# Patient Record
Sex: MALE | Race: WHITE | NOT HISPANIC OR LATINO | URBAN - METROPOLITAN AREA
[De-identification: names, ages, dates, MRNs, and addresses within clinical notes are randomized per-mention and may not be internally consistent; named-entity substitution may affect disease eponyms.]

---

## 2017-08-01 ENCOUNTER — EMERGENCY (EMERGENCY)
Facility: HOSPITAL | Age: 62
LOS: 1 days | Discharge: HOME | End: 2017-08-01
Attending: EMERGENCY MEDICINE | Admitting: EMERGENCY MEDICINE
Payer: COMMERCIAL

## 2017-08-01 VITALS
DIASTOLIC BLOOD PRESSURE: 76 MMHG | TEMPERATURE: 98 F | SYSTOLIC BLOOD PRESSURE: 147 MMHG | HEART RATE: 58 BPM | RESPIRATION RATE: 16 BRPM | OXYGEN SATURATION: 96 %

## 2017-08-01 DIAGNOSIS — Z79.2 LONG TERM (CURRENT) USE OF ANTIBIOTICS: ICD-10-CM

## 2017-08-01 DIAGNOSIS — H93.19 TINNITUS, UNSPECIFIED EAR: ICD-10-CM

## 2017-08-01 DIAGNOSIS — R09.81 NASAL CONGESTION: ICD-10-CM

## 2017-08-01 DIAGNOSIS — R51 HEADACHE: ICD-10-CM

## 2017-08-01 DIAGNOSIS — J34.89 OTHER SPECIFIED DISORDERS OF NOSE AND NASAL SINUSES: ICD-10-CM

## 2017-08-01 PROCEDURE — 99283 EMERGENCY DEPT VISIT LOW MDM: CPT

## 2017-08-01 RX ADMIN — Medication 1 TABLET(S): at 16:44

## 2017-08-01 NOTE — ED PROVIDER NOTE - MEDICAL DECISION MAKING DETAILS
plan: po abxs, pt already has flonase at [facility], no red flags for concerning etiology for HA.

## 2017-08-01 NOTE — ED PROVIDER NOTE - CARDIAC, MLM
Normal rate, regular rhythm.  Heart sounds S1, S2.  No murmurs, rubs or gallops. normal rate, no murmurs

## 2017-08-01 NOTE — ED PROVIDER NOTE - ENMT, MLM
Airway patent, Nasal mucosa clear. Mouth with normal mucosa. Throat has no vesicles, no oropharyngeal exudates and uvula is midline. Mild pressure maxillary sinuses.

## 2017-08-01 NOTE — ED PROVIDER NOTE - OBJECTIVE STATEMENT
63 y/o M c/o HA, congestion, and sinus pressure x 3 days. Reports h/o recurrent sinusitis in the past usually every 6 months. Has seen ENT for recurrent sinusitis. Pt also c/o b/l tinnitus which he usually has with sinusitis. Denies f/c, chest pain, cough, ear pain, neck pain or rash.

## 2017-08-02 PROBLEM — Z00.00 ENCOUNTER FOR PREVENTIVE HEALTH EXAMINATION: Status: ACTIVE | Noted: 2017-08-02

## 2017-11-03 ENCOUNTER — APPOINTMENT (OUTPATIENT)
Dept: OTOLARYNGOLOGY | Facility: CLINIC | Age: 62
End: 2017-11-03
Payer: COMMERCIAL

## 2017-11-03 VITALS
HEART RATE: 65 BPM | WEIGHT: 210 LBS | DIASTOLIC BLOOD PRESSURE: 67 MMHG | HEIGHT: 67 IN | SYSTOLIC BLOOD PRESSURE: 118 MMHG | BODY MASS INDEX: 32.96 KG/M2

## 2017-11-03 DIAGNOSIS — J34.3 HYPERTROPHY OF NASAL TURBINATES: ICD-10-CM

## 2017-11-03 DIAGNOSIS — J34.2 DEVIATED NASAL SEPTUM: ICD-10-CM

## 2017-11-03 PROCEDURE — 31231 NASAL ENDOSCOPY DX: CPT

## 2017-11-03 PROCEDURE — 99215 OFFICE O/P EST HI 40 MIN: CPT | Mod: 25

## 2017-12-13 ENCOUNTER — APPOINTMENT (OUTPATIENT)
Dept: OTOLARYNGOLOGY | Facility: CLINIC | Age: 62
End: 2017-12-13

## 2018-05-10 ENCOUNTER — APPOINTMENT (OUTPATIENT)
Dept: OTOLARYNGOLOGY | Facility: CLINIC | Age: 63
End: 2018-05-10
Payer: COMMERCIAL

## 2018-05-10 VITALS
SYSTOLIC BLOOD PRESSURE: 134 MMHG | WEIGHT: 208 LBS | DIASTOLIC BLOOD PRESSURE: 84 MMHG | BODY MASS INDEX: 39.27 KG/M2 | HEIGHT: 61 IN | HEART RATE: 64 BPM

## 2018-05-10 DIAGNOSIS — J32.8 OTHER CHRONIC SINUSITIS: ICD-10-CM

## 2018-05-10 DIAGNOSIS — J01.81 OTHER ACUTE RECURRENT SINUSITIS: ICD-10-CM

## 2018-05-10 PROCEDURE — 99214 OFFICE O/P EST MOD 30 MIN: CPT

## 2018-05-10 RX ORDER — FLUTICASONE PROPIONATE 50 UG/1
50 SPRAY, METERED NASAL DAILY
Qty: 1 | Refills: 3 | Status: COMPLETED | COMMUNITY
Start: 2017-11-03 | End: 2018-05-10

## 2018-05-10 RX ORDER — FLUTICASONE PROPIONATE 50 UG/1
50 SPRAY, METERED NASAL
Refills: 0 | Status: COMPLETED | COMMUNITY
End: 2018-05-10

## 2018-05-11 PROBLEM — J32.8 OTHER CHRONIC SINUSITIS: Status: ACTIVE | Noted: 2017-11-03

## 2018-05-13 PROBLEM — J01.81 OTHER ACUTE RECURRENT SINUSITIS: Status: RESOLVED | Noted: 2017-11-27 | Resolved: 2018-05-13

## 2018-05-30 ENCOUNTER — APPOINTMENT (OUTPATIENT)
Dept: OTOLARYNGOLOGY | Facility: CLINIC | Age: 63
End: 2018-05-30

## 2018-06-20 ENCOUNTER — APPOINTMENT (OUTPATIENT)
Dept: OTOLARYNGOLOGY | Facility: CLINIC | Age: 63
End: 2018-06-20
Payer: COMMERCIAL

## 2018-06-20 VITALS
HEIGHT: 66 IN | BODY MASS INDEX: 34.55 KG/M2 | SYSTOLIC BLOOD PRESSURE: 124 MMHG | WEIGHT: 215 LBS | DIASTOLIC BLOOD PRESSURE: 79 MMHG | HEART RATE: 64 BPM

## 2018-06-20 PROCEDURE — 31575 DIAGNOSTIC LARYNGOSCOPY: CPT

## 2018-06-20 PROCEDURE — 99214 OFFICE O/P EST MOD 30 MIN: CPT | Mod: 25

## 2018-06-28 RX ORDER — VALACYCLOVIR 1 G/1
1 TABLET, FILM COATED ORAL DAILY
Refills: 0 | Status: COMPLETED | COMMUNITY
End: 2018-05-30

## 2018-06-29 ENCOUNTER — APPOINTMENT (OUTPATIENT)
Dept: CT IMAGING | Facility: CLINIC | Age: 63
End: 2018-06-29

## 2018-06-29 ENCOUNTER — OUTPATIENT (OUTPATIENT)
Dept: OUTPATIENT SERVICES | Facility: HOSPITAL | Age: 63
LOS: 1 days | End: 2018-06-29

## 2018-06-29 ENCOUNTER — EMERGENCY (EMERGENCY)
Facility: HOSPITAL | Age: 63
LOS: 1 days | Discharge: HOME | End: 2018-06-29
Admitting: EMERGENCY MEDICINE
Payer: COMMERCIAL

## 2018-06-29 VITALS
HEART RATE: 52 BPM | OXYGEN SATURATION: 100 % | DIASTOLIC BLOOD PRESSURE: 63 MMHG | TEMPERATURE: 97 F | RESPIRATION RATE: 18 BRPM | SYSTOLIC BLOOD PRESSURE: 129 MMHG

## 2018-06-29 VITALS — HEART RATE: 47 BPM

## 2018-06-29 DIAGNOSIS — Z79.2 LONG TERM (CURRENT) USE OF ANTIBIOTICS: ICD-10-CM

## 2018-06-29 DIAGNOSIS — R07.0 PAIN IN THROAT: ICD-10-CM

## 2018-06-29 DIAGNOSIS — R55 SYNCOPE AND COLLAPSE: ICD-10-CM

## 2018-06-29 LAB
ALBUMIN SERPL ELPH-MCNC: 3.6 G/DL — SIGNIFICANT CHANGE UP (ref 3.4–5)
ALP SERPL-CCNC: 49 U/L — SIGNIFICANT CHANGE UP (ref 40–120)
ALT FLD-CCNC: 30 U/L — SIGNIFICANT CHANGE UP (ref 12–42)
ANION GAP SERPL CALC-SCNC: 8 MMOL/L — LOW (ref 9–16)
AST SERPL-CCNC: 24 U/L — SIGNIFICANT CHANGE UP (ref 15–37)
BILIRUB SERPL-MCNC: 1.1 MG/DL — SIGNIFICANT CHANGE UP (ref 0.2–1.2)
BUN SERPL-MCNC: 20 MG/DL — SIGNIFICANT CHANGE UP (ref 7–23)
CALCIUM SERPL-MCNC: 9.1 MG/DL — SIGNIFICANT CHANGE UP (ref 8.5–10.5)
CHLORIDE SERPL-SCNC: 107 MMOL/L — SIGNIFICANT CHANGE UP (ref 96–108)
CO2 SERPL-SCNC: 25 MMOL/L — SIGNIFICANT CHANGE UP (ref 22–31)
CREAT SERPL-MCNC: 1.2 MG/DL — SIGNIFICANT CHANGE UP (ref 0.5–1.3)
D DIMER BLD IA.RAPID-MCNC: <150 NG/ML DDU — SIGNIFICANT CHANGE UP
GLUCOSE SERPL-MCNC: 118 MG/DL — HIGH (ref 70–99)
HCT VFR BLD CALC: 42.7 % — SIGNIFICANT CHANGE UP (ref 39–50)
HGB BLD-MCNC: 15.6 G/DL — SIGNIFICANT CHANGE UP (ref 13–17)
MCHC RBC-ENTMCNC: 31.1 PG — SIGNIFICANT CHANGE UP (ref 27–34)
MCHC RBC-ENTMCNC: 36.5 G/DL — HIGH (ref 32–36)
MCV RBC AUTO: 85.2 FL — SIGNIFICANT CHANGE UP (ref 80–100)
PLATELET # BLD AUTO: 203 K/UL — SIGNIFICANT CHANGE UP (ref 150–400)
POTASSIUM SERPL-MCNC: 3.8 MMOL/L — SIGNIFICANT CHANGE UP (ref 3.5–5.3)
POTASSIUM SERPL-SCNC: 3.8 MMOL/L — SIGNIFICANT CHANGE UP (ref 3.5–5.3)
PROT SERPL-MCNC: 7 G/DL — SIGNIFICANT CHANGE UP (ref 6.4–8.2)
RBC # BLD: 5.01 M/UL — SIGNIFICANT CHANGE UP (ref 4.2–5.8)
RBC # FLD: 11.8 % — SIGNIFICANT CHANGE UP (ref 10.3–16.9)
SODIUM SERPL-SCNC: 140 MMOL/L — SIGNIFICANT CHANGE UP (ref 132–145)
TROPONIN I SERPL-MCNC: <0.017 NG/ML — LOW (ref 0.02–0.06)
WBC # BLD: 8.6 K/UL — SIGNIFICANT CHANGE UP (ref 3.8–10.5)
WBC # FLD AUTO: 8.6 K/UL — SIGNIFICANT CHANGE UP (ref 3.8–10.5)

## 2018-06-29 PROCEDURE — 99284 EMERGENCY DEPT VISIT MOD MDM: CPT | Mod: 25

## 2018-06-29 PROCEDURE — 70491 CT SOFT TISSUE NECK W/DYE: CPT | Mod: 26

## 2018-06-29 PROCEDURE — 93010 ELECTROCARDIOGRAM REPORT: CPT

## 2018-06-29 RX ORDER — ATROPINE SULFATE 0.1 MG/ML
0.5 SYRINGE (ML) INJECTION ONCE
Qty: 0 | Refills: 0 | Status: COMPLETED | OUTPATIENT
Start: 2018-06-29 | End: 2018-06-29

## 2018-06-29 RX ORDER — SODIUM CHLORIDE 9 MG/ML
1000 INJECTION INTRAMUSCULAR; INTRAVENOUS; SUBCUTANEOUS ONCE
Qty: 0 | Refills: 0 | Status: COMPLETED | OUTPATIENT
Start: 2018-06-29 | End: 2018-06-29

## 2018-06-29 RX ORDER — ONDANSETRON 8 MG/1
4 TABLET, FILM COATED ORAL ONCE
Qty: 0 | Refills: 0 | Status: COMPLETED | OUTPATIENT
Start: 2018-06-29 | End: 2018-06-29

## 2018-06-29 RX ADMIN — SODIUM CHLORIDE 2000 MILLILITER(S): 9 INJECTION INTRAMUSCULAR; INTRAVENOUS; SUBCUTANEOUS at 13:00

## 2018-06-29 RX ADMIN — ONDANSETRON 4 MILLIGRAM(S): 8 TABLET, FILM COATED ORAL at 14:15

## 2018-06-29 RX ADMIN — SODIUM CHLORIDE 1000 MILLILITER(S): 9 INJECTION INTRAMUSCULAR; INTRAVENOUS; SUBCUTANEOUS at 14:58

## 2018-06-29 NOTE — ED ADULT NURSE REASSESSMENT NOTE - NS ED NURSE REASSESS COMMENT FT1
Pt alert, attached to the cardiac monitor. Denies pain or dizziness. Pt awaiting test results and disposition

## 2018-06-29 NOTE — ED PROVIDER NOTE - PHYSICAL EXAMINATION
Vital Signs - nursing notes reviewed and confirmed  Gen - WDWN M, NAD, comfortable and non-toxic appearing, speaking in full sentences   Skin - warm, clammy, no acute rash   HEENT - AT/NC, PERRL, EOMI, no conjunctival injection, moist oral mucosa, o/p clear with no erythema, edema, or exudate, uvula midline, airway patent, neck supple and NT, FROM, no JVD or carotid bruits b/l, no palpable nodes  CV - S1S2, R/R/R  Resp - respiration non-labored, CTAB, symmetric bs b/l, no r/r/w  GI - NABS, soft, ND, NT, no rebound or guarding, no CVAT b/l   MS - w/w/p, no c/c/e, calves supple and NT, distal pulses symmetric b/l, brisk cap refills   Neuro - AxOx3, no focal neuro deficits, CN II-XII grossly intact, cerebellar function intact, negative pronator drift, negative nystagmus, ambulatory without gait disturbance

## 2018-06-29 NOTE — ED PROVIDER NOTE - MEDICAL DECISION MAKING DETAILS
AFVSS at time of d/c, pt non-toxic appearing, results, ddx, and f/u plans discussed with pt at bedside, d/c'd home to f/u with PMD, strict return precautions discussed, prompt return to ER for any worsening or new sx, pt verbalized understanding. pt scheduled for outpt CT this morning, has been fasting since last night, also ran 3 miles this morning, with h/o vasovagal syncope, s/p IV insertion with witnessed syncope, no focal neuro deficits, EKG wnl, labs unremarkable, francisco to 40s on cardiac monitor but pt asymptomatic and at baseline fit and athletic, trop neg x 2, CT neck results discussed, sx markedly improved s/p IVF, AFVSS at time of d/c, pt non-toxic appearing, results, ddx, and f/u plans discussed with pt at bedside, d/c'd home to f/u with PMD and ENT, strict return precautions discussed, prompt return to ER for any worsening or new sx, pt verbalized understanding.

## 2018-06-29 NOTE — ED ADULT NURSE REASSESSMENT NOTE - NS ED NURSE REASSESS COMMENT FT1
Pt layign in stretcher in no acute distres. Maintained on cardiac monitor, sinus bradycardia (42-46). Pt denies any MEYER or dizziness. Pt reports feeling better compared to when he arrived at ER. LADARIUS Barajas made aware. As per LADARIUS Barajas, hold Atropine, administer only if pt symptomatic. Call bell within reach. Safety precautions maintained.

## 2018-06-29 NOTE — ED PROVIDER NOTE - OBJECTIVE STATEMENT
64 yo M with PMHx of vasovagal syncope due to sight of blood in the past, sent from outpt imaging center from [facility] for witnessed syncope.  Pt reports fasting since last night and was getting an IV inserted upstairs in the outpt imaging center 64 yo M with PMHx of vasovagal syncope due to sight of blood in the past, sent from outpt imaging center from [facility] for witnessed syncope.  Pt reports fasting since last night for schedule outpt CT neck with IV contrast today and was getting an IV inserted upstairs in the outpt imaging center, noted pt turned pale, sweaty and syncopized.  Collapsed onto the floor, picked up by the staff and placed on stretcher, regained consciousness without any interventions in a few seconds.  Pt reports feeling nauseated subsequently.  Denies other prodromes, head trauma, vertigo, numbness, tingling, photophobia, diplopia, change in vision/hearing/gait/mental status/speech, focal weakness, rash, fever, chills, stiffness, CP, SOB, palpitations, diaphoresis, N/V/D/C, abdominal pain, change in urinary/bowel function/incontinence, dysuria, hematuria, flank pain, and malaise.

## 2018-06-29 NOTE — ED PROVIDER NOTE - CARE PLAN
Principal Discharge DX:	Vasovagal syncope Principal Discharge DX:	Vasovagal syncope  Secondary Diagnosis:	Throat pain

## 2018-06-29 NOTE — ED ADULT NURSE NOTE - OBJECTIVE STATEMENT
Pt. was at imaging suite when he syncopized during IV insertion. Pt. reports being NPO since last night and he ran [employer] this morning before his exam. Pt. feeling better at this time, remains on continuous cardiac monitoring and IV fluids running.

## 2018-06-29 NOTE — ED ADULT TRIAGE NOTE - CHIEF COMPLAINT QUOTE
pt. was upstairs in radiology when they were inserting IV. pt. has a syncopal episode. pt. has a hx of vasovagal maneuvers. pt. aaox3, able to answer questions.

## 2018-07-20 ENCOUNTER — APPOINTMENT (OUTPATIENT)
Dept: OTOLARYNGOLOGY | Facility: CLINIC | Age: 63
End: 2018-07-20
Payer: COMMERCIAL

## 2018-07-20 VITALS
BODY MASS INDEX: 34.39 KG/M2 | WEIGHT: 214 LBS | HEART RATE: 67 BPM | DIASTOLIC BLOOD PRESSURE: 67 MMHG | HEIGHT: 66 IN | SYSTOLIC BLOOD PRESSURE: 107 MMHG

## 2018-07-20 DIAGNOSIS — B00.2 HERPESVIRAL GINGIVOSTOMATITIS AND PHARYNGOTONSILLITIS: ICD-10-CM

## 2018-07-20 PROCEDURE — 31575 DIAGNOSTIC LARYNGOSCOPY: CPT

## 2018-07-20 PROCEDURE — 99215 OFFICE O/P EST HI 40 MIN: CPT | Mod: 25

## 2018-07-20 RX ORDER — CLINDAMYCIN HYDROCHLORIDE 300 MG/1
300 CAPSULE ORAL
Qty: 30 | Refills: 0 | Status: DISCONTINUED | COMMUNITY
Start: 2018-06-20 | End: 2018-07-20

## 2018-07-24 PROBLEM — B00.2 ACUTE PHARYNGITIS DUE TO HERPES SIMPLEX VIRUS (HSV): Status: RESOLVED | Noted: 2018-05-11 | Resolved: 2018-07-24

## 2018-07-31 PROBLEM — J32.9 CHRONIC SINUSITIS, UNSPECIFIED: Chronic | Status: ACTIVE | Noted: 2017-08-01

## 2018-07-31 PROBLEM — R55 SYNCOPE AND COLLAPSE: Chronic | Status: ACTIVE | Noted: 2018-06-29

## 2018-08-27 NOTE — PHYSICAL EXAM
[] : septum deviated to the left [Laryngoscopy Performed] : laryngoscopy was performed, see procedure section for findings [de-identified] : 1-2+ bilateral.  Left tonsil with mild erythyema; cloudy mucus expressed from crypts when tonsil is pressed on.  Right tonsil is normal. [Normal] : no neck adenopathy

## 2018-08-27 NOTE — CONSULT LETTER
[Dear  ___] : Dear  [unfilled], [Courtesy Letter:] : I had the pleasure of seeing your patient, [unfilled], in my office today. [Consult Closing:] : Thank you very much for allowing me to participate in the care of this patient.  If you have any questions, please do not hesitate to contact me. [Sincerely,] : Sincerely, [___] : [unfilled] [FreeTextEntry2] : Dr. Blake Carranza\par [address] [place] [address]\par [place] [id number] [FreeTextEntry1] : \par \par \par Enclosed please find my office notes for July 20, 2018.\par \par

## 2018-08-27 NOTE — HISTORY OF PRESENT ILLNESS
[de-identified] : Mr. WADSWORTH was seen in f/up for chronic left throat pain.\par \par Two weeks ago he saw another provider and mentioned the left sharp pain and throbbing, cyclobenzaprine and Naproxen was prescribed. Since starting on the new medication, he has noticed an improvement in muscle tension. \par The sharpness in his throat is not as severe but is still persistent. \par He wakes up with a mild sore throat that can last all day. No problems with swallowing. \par Carbonated soda and dairy cause throat to become more severe. \par Sometimes when he massages his tonsil he feels better. \par He states pain was about 7-8/10 initially, now 4/10. \par \par \par CT NECK with contrast (6-) at [facility]:\par *  Aerodigestive Tract: Imaging through the oral cavity and oropharynx is somewhat limited by streak artifact. There is questionable asymmetric fullness/lucent appearance on the left lateral oropharynx (series 2, image 53; series 601, image 47). This is not appreciated, however, on the re-angled series 4. There is no suspicious site of nodular or masslike contrast enhancement. The airway is patent. The larynx appears within normal limits. No retropharyngeal edema.\par *  Lymph Nodes: There are no pathologically enlarged or morphologically suspicious lymph nodes in the neck\par *  Salivary Glands: No focal lesion or acute inflammatory change\par *  Thyroid Gland: Unremarkable\par *  Orbits: Unremarkable\par *  Brain: The partially imaged intracranial compartment shows no acute finding. No hydrocephalus or brain herniation\par *  Skull Base: Intact\par *  Paranasal Sinuses: Well ventilated aside from minimal left maxillary sinus for polyp or cyst.\par *  Mastoids: Well ventilated\par *  Cervical Spine: No acute finding. There is moderate degenerative disc disease at C5-6, C6-7 and C7-T1.\par *  Lung Apices: The included portions of the lung apices show no suspicious mass or nodule.\par IMPRESSION:\par - Source of left throat/neck pain is not clearly elucidated. There is no suspicious nodule or masslike enhancement along the aerodigestive tract.  \par - Questionable asymmetric fullness/lucency along the left lateral oropharynx. Consider correlation with direct inspection and ENT referral as clinically warranted.\par (Images were reviewed.)\par \par Prior Hx:\par Starting in 2nd week of May, treated with Valtrex for 20 days for possible herpes pharyngitis. His throat pain improved greatly. 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 \par About a week after stopping the Valtrex, had severe pain in back of throat on left, along with tightness left lateral neck like choking. His throat pain is sharp and worse than before. Pain worse when wakes in AM. \par Massage of trapezius muscle on left side relieves the soreness in left throat muscle and pain in left lower neck.\par \par \par LABS\par (5/02/2018) - HSV 1 IGG 54 (H), HSV 2 IGG 4.03 (H), C. Trachomatis negative, N. Gonorrhea RNA negative, RPR negative, Throat culture negative, WBC 7.6, CRP 2.4\par

## 2018-08-27 NOTE — ASSESSMENT
[FreeTextEntry1] : Mr. Street has the following issues identified:\par \par 1.) Left throat pain -- due to chronic tonsillitis on left side\par 2.) Left neck pain - likely musculoskeletal; improvement with cyclobenzaprine and Naproxen\par \par Plan:\par -- Continue using Cyclobenzaprine and Naproxen for neck pain as needed\par -- May need physical therapy for neck pain \par -- Peridex gargles\par -- I recommend left tonsillectomy to alleviate the chronic throat pain.  The right tonsil is small and not symptomatic so will not remove.  I have explained the risks of tonsillectomy including but not limited to general anesthesia, bleeding, infection, injury to the teeth/lips/gums, tonsil regrowth, persistence of symptoms, velopharyngeal insufficiency, swallowing dysfunction, post-operative hemorrhage, voice changes, and possible need for further procedures.  His questions were answered and he agreed to proceed.\par Surgery is scheduled for August 28, 2018.\par Medical evaluation and clearance from Dr. Carranza would be greatly appreciated.

## 2018-08-28 ENCOUNTER — OUTPATIENT (OUTPATIENT)
Dept: OUTPATIENT SERVICES | Facility: HOSPITAL | Age: 63
LOS: 1 days | Discharge: HOME | End: 2018-08-28

## 2018-08-28 ENCOUNTER — RESULT REVIEW (OUTPATIENT)
Age: 63
End: 2018-08-28

## 2018-08-28 ENCOUNTER — APPOINTMENT (OUTPATIENT)
Dept: OTOLARYNGOLOGY | Facility: HOSPITAL | Age: 63
End: 2018-08-28
Payer: COMMERCIAL

## 2018-08-28 PROCEDURE — 42826 REMOVAL OF TONSILS: CPT

## 2018-08-31 LAB — SURGICAL PATHOLOGY STUDY: SIGNIFICANT CHANGE UP

## 2018-09-12 ENCOUNTER — APPOINTMENT (OUTPATIENT)
Dept: OTOLARYNGOLOGY | Facility: CLINIC | Age: 63
End: 2018-09-12

## 2018-09-12 ENCOUNTER — APPOINTMENT (OUTPATIENT)
Dept: OTOLARYNGOLOGY | Facility: CLINIC | Age: 63
End: 2018-09-12
Payer: COMMERCIAL

## 2018-09-12 VITALS
BODY MASS INDEX: 33.91 KG/M2 | HEIGHT: 66 IN | SYSTOLIC BLOOD PRESSURE: 130 MMHG | HEART RATE: 66 BPM | DIASTOLIC BLOOD PRESSURE: 74 MMHG | WEIGHT: 211 LBS

## 2018-09-12 DIAGNOSIS — J35.8 OTHER CHRONIC DISEASES OF TONSILS AND ADENOIDS: ICD-10-CM

## 2018-09-12 DIAGNOSIS — G89.18 OTHER ACUTE POSTPROCEDURAL PAIN: ICD-10-CM

## 2018-09-12 PROCEDURE — 99024 POSTOP FOLLOW-UP VISIT: CPT

## 2018-09-12 RX ORDER — CHLORHEXIDINE GLUCONATE, 0.12% ORAL RINSE 1.2 MG/ML
0.12 SOLUTION DENTAL
Qty: 1 | Refills: 0 | Status: DISCONTINUED | COMMUNITY
Start: 2018-07-20 | End: 2018-09-12

## 2018-09-12 RX ORDER — NAPROXEN 500 MG/1
TABLET ORAL
Refills: 0 | Status: DISCONTINUED | COMMUNITY
End: 2018-09-12

## 2018-09-12 RX ORDER — AMOXICILLIN 400 MG/5ML
400 FOR SUSPENSION ORAL TWICE DAILY
Qty: 5 | Refills: 0 | Status: DISCONTINUED | COMMUNITY
Start: 2018-08-28 | End: 2018-09-12

## 2018-09-12 RX ORDER — CYCLOBENZAPRINE HYDROCHLORIDE 7.5 MG/1
TABLET, FILM COATED ORAL
Refills: 0 | Status: DISCONTINUED | COMMUNITY
End: 2018-09-12

## 2018-09-13 PROBLEM — G89.18 POST-OP PAIN: Status: RESOLVED | Noted: 2018-08-28 | Resolved: 2018-09-13

## 2018-09-13 PROBLEM — J35.8 TONSILLITH: Status: RESOLVED | Noted: 2018-07-20 | Resolved: 2018-09-13

## 2018-10-10 ENCOUNTER — APPOINTMENT (OUTPATIENT)
Dept: OTOLARYNGOLOGY | Facility: CLINIC | Age: 63
End: 2018-10-10

## 2019-01-10 ENCOUNTER — APPOINTMENT (OUTPATIENT)
Dept: OTOLARYNGOLOGY | Facility: CLINIC | Age: 64
End: 2019-01-10

## 2019-01-23 ENCOUNTER — APPOINTMENT (OUTPATIENT)
Dept: OTOLARYNGOLOGY | Facility: CLINIC | Age: 64
End: 2019-01-23
Payer: COMMERCIAL

## 2019-01-23 VITALS
WEIGHT: 204 LBS | HEIGHT: 66 IN | HEART RATE: 72 BPM | BODY MASS INDEX: 32.78 KG/M2 | SYSTOLIC BLOOD PRESSURE: 111 MMHG | DIASTOLIC BLOOD PRESSURE: 69 MMHG

## 2019-01-23 PROCEDURE — 99214 OFFICE O/P EST MOD 30 MIN: CPT | Mod: 25

## 2019-01-23 PROCEDURE — 31575 DIAGNOSTIC LARYNGOSCOPY: CPT

## 2019-01-30 NOTE — PHYSICAL EXAM
[] : septum deviated to the left [Midline] : trachea located in midline position [Removed] : palatine tonsils previously removed [Normal] : no neck adenopathy [Laryngoscopy Performed] : laryngoscopy was performed, see procedure section for findings [de-identified] : Mild erythema at junction of left tonsil fossa and posterior wall - accessory lymphoid tissue.

## 2019-01-30 NOTE — PROCEDURE
[de-identified] : \par Indication:  reflux\par -Verbal consent was obtained from patient prior to procedure.\par -Ja-Synephrine and lidocaine 2% spray applied to the nasal cavities.\par Flexible laryngoscopy was performed via nostril and revealed the following:\par   -- Nasopharynx had no mass or exudate.\par   -- Base of tongue was symmetric and not enlarged.\par   -- Vallecula was clear\par   -- Epiglottis, both aryepiglottic folds and both false vocal folds were normal\par   -- Arytenoids both with mild edema and erythema \par   -- True vocal folds were fully mobile and without lesions. \par   -- Post cricoid area was clear.\par   -- Interarytenoid edema was absent \par \par The patient tolerated the procedure well.\par

## 2019-01-30 NOTE — HISTORY OF PRESENT ILLNESS
[de-identified] : Mr. WADSWORTH  is a 63 year old M being seen today for recurrent throat pain. \par \par S/p left tonsillectomy for chronic tonsillitis on August 28, 2018 at [facility].\par When he massaged left tonsil scar, low level pain would go away.\par Then during from the middle of October to mid December 2018, he started experiencing mild left-sided throat discomfort, similar to his initial symptom, but not as severe. When pain is more severe, the left side of his neck gets tense, better when applies warm compress.  The muscular pain in left neck is mostly gone\par He feels the discomfort starts at top of his left tonsil scar\par On December 15 he noticed the pain become much stronger, causing a persistent sharp/throbbing pain x 3 weeks.\par Now severity of throat discomfort is about 1-2/10, but it is causing him increased anxiety. \par \par Also since tonsillectomy, he is complaining of indigestion, nausea and gas. \par Has thick saliva and phlegm in throat after eating. \par Does not think the cause is acid reflux because he only feels the sx on one side. \par Pain seems to decrease if a warm compress on the neck is placed. \par Pain also seems to temporarily resolve when he blows his nose and mucus comes out of left nostril. \par Lozenges do not help. \par \par \par PATHOLOGY Left tonsil (8/28/2018)\par - Tonsillar tissue with reactive with reactive lymphoid hyperplasia \par

## 2019-01-30 NOTE — ASSESSMENT
[FreeTextEntry1] : Mr. WADSWORTH has throat pain that is diminished but still present on left side.\par He is s/p-left tonsillectomy for chronic tonsillitis. \par There is a small scar band in the left tonsil fossa, as well a a small area of erythema that is c/w accessory lymphoid tissue along the border of fossa and posterior wall pharynx.\par He also may have element of acid reflux causing his other symptoms.\par \par Plan:\par - Salt water gargles as needed\par - Reflux precautions\par - return to office for cautery ablation of accessory tonsil tissue on lateral posterior pharyngeal wall left side\par

## 2019-01-30 NOTE — CONSULT LETTER
[Dear  ___] : Dear  [unfilled], [Courtesy Letter:] : I had the pleasure of seeing your patient, [unfilled], in my office today. [Consult Closing:] : Thank you very much for allowing me to participate in the care of this patient.  If you have any questions, please do not hesitate to contact me. [Sincerely,] : Sincerely, [FreeTextEntry2] : Dr. Blake Carranza\par [address] [place] [address]\par [place] [id number] [FreeTextEntry1] : \par \par Enclosed please find my office notes for January 23, 2019. \par \par \par \par  [FreeTextEntry3] : \par Natalie Trejo MD \par Otolaryngology, Head and Neck Surgery \par Residency site , [facility]\par

## 2019-02-06 ENCOUNTER — APPOINTMENT (OUTPATIENT)
Dept: OTOLARYNGOLOGY | Facility: CLINIC | Age: 64
End: 2019-02-06
Payer: COMMERCIAL

## 2019-02-06 VITALS
DIASTOLIC BLOOD PRESSURE: 57 MMHG | HEIGHT: 66 IN | BODY MASS INDEX: 32.78 KG/M2 | WEIGHT: 204 LBS | SYSTOLIC BLOOD PRESSURE: 109 MMHG | HEART RATE: 49 BPM

## 2019-02-06 PROCEDURE — 42808 EXCISE PHARYNX LESION: CPT

## 2019-03-01 ENCOUNTER — APPOINTMENT (OUTPATIENT)
Dept: OTOLARYNGOLOGY | Facility: CLINIC | Age: 64
End: 2019-03-01
Payer: COMMERCIAL

## 2019-03-01 VITALS
DIASTOLIC BLOOD PRESSURE: 65 MMHG | SYSTOLIC BLOOD PRESSURE: 121 MMHG | HEIGHT: 66 IN | HEART RATE: 58 BPM | BODY MASS INDEX: 32.14 KG/M2 | WEIGHT: 200 LBS

## 2019-03-01 PROCEDURE — 42808 EXCISE PHARYNX LESION: CPT

## 2019-03-01 PROCEDURE — 99213 OFFICE O/P EST LOW 20 MIN: CPT | Mod: 25

## 2019-03-02 NOTE — PROCEDURE
[FreeTextEntry1] : cautery ablation of left posterior pharyngeal tonsil tissue [FreeTextEntry2] : chronic tonsillitis and throat pain [FreeTextEntry3] : After informed consent was obtained, the left posterior pharyngeal wall tonsil tissue was sprayed with Cetacaine.  The residual tonsil tissue was cauterized with silver nitrate.  No bleeding occurred.\par He tolerated procedure well.\par

## 2019-03-02 NOTE — CONSULT LETTER
[Dear  ___] : Dear  [unfilled], [Courtesy Letter:] : I had the pleasure of seeing your patient, [unfilled], in my office today. [Consult Closing:] : Thank you very much for allowing me to participate in the care of this patient.  If you have any questions, please do not hesitate to contact me. [Sincerely,] : Sincerely, [FreeTextEntry2] : Dr. Blake Carranza\par [address] [place] [address]\par [place] [phone] [FreeTextEntry1] : \par \par Enclosed please find my office notes for March 1, 2019. \par \par \par \par  [FreeTextEntry3] : \par Natalie Trejo MD \par Otolaryngology, Head and Neck Surgery \par Residency site , [facility]\par

## 2019-03-02 NOTE — HISTORY OF PRESENT ILLNESS
[de-identified] : Mr. Street reports that left throat pain almost disappeared after cautery in last month.\par This week, pain is noticeable but much less than before.\par By this time every year, before the tonsillectomy, he would get URI and lingering sore throat but has not, so he is happy he had the surgery done.\par S/p left tonsillectomy for chronic tonsillitis on August 28, 2018.

## 2019-03-02 NOTE — ASSESSMENT
[FreeTextEntry1] : MR. Street had cautery of left posterior wall tonsil tissue.\par \par He will return in 1 month if he still has pain.

## 2019-03-02 NOTE — PROCEDURE
[FreeTextEntry1] : cautery ablation of left posterior pharyngeal tonsil tissues [FreeTextEntry2] : chronic tonsillitis [FreeTextEntry3] : After informed consent was obtained, the left posterior pharyngeal wall tonsil tissue was sprayed with Cetacaine.  The tonsil tissue was then cauterized with silver nitrate.  No bleeding occurred.\par

## 2019-03-02 NOTE — PHYSICAL EXAM
[] : septum deviated to the left [Midline] : trachea located in midline position [Removed] : palatine tonsils previously removed [Laryngoscopy Performed] : laryngoscopy was performed, see procedure section for findings [Normal] : no neck adenopathy [de-identified] : Mild erythema at junction of left tonsil fossa and posterior wall - accessory lymphoid tissue.

## 2019-03-02 NOTE — HISTORY OF PRESENT ILLNESS
[de-identified] : Mr. Street is still having mild pain in left side of throat.\par He is here for cautery ablation of posterior pharyngeal tonsil tissue.

## 2019-03-02 NOTE — ASSESSMENT
[FreeTextEntry1] : MR. Street had repeat cautery of left posterior wall tonsil tissue.\par Throat pain diminished markedly after initial cautery 1 month ago.\par \par He will return in 1 month if he still has pain.

## 2019-04-05 ENCOUNTER — APPOINTMENT (OUTPATIENT)
Dept: OTOLARYNGOLOGY | Facility: CLINIC | Age: 64
End: 2019-04-05
Payer: COMMERCIAL

## 2019-04-05 VITALS
DIASTOLIC BLOOD PRESSURE: 63 MMHG | HEIGHT: 66 IN | BODY MASS INDEX: 31.18 KG/M2 | WEIGHT: 194 LBS | SYSTOLIC BLOOD PRESSURE: 109 MMHG | HEART RATE: 54 BPM

## 2019-04-05 PROCEDURE — 99213 OFFICE O/P EST LOW 20 MIN: CPT

## 2019-04-05 NOTE — ASSESSMENT
[FreeTextEntry1] : Mr. Street has only mild throat discomfort on left side now.\par Today, he had repeat cautery of left posterior-lateral wall tonsil tissue.\par \par He will return in 1 month if he still has pain.

## 2019-04-05 NOTE — PHYSICAL EXAM
[] : septum deviated to the left [Removed] : palatine tonsils previously removed [Normal] : no neck adenopathy [de-identified] : Minimal erythema at junction of left tonsil fossa and posterolateral wall - accessory lymphoid tissue was cauterized with silver nitrate again today.

## 2019-04-05 NOTE — CONSULT LETTER
[Dear  ___] : Dear ~RIDDHI, [Courtesy Letter:] : I had the pleasure of seeing your patient, [unfilled], in my office today. [Consult Closing:] : Thank you very much for allowing me to participate in the care of this patient.  If you have any questions, please do not hesitate to contact me. [Sincerely,] : Sincerely, [FreeTextEntry2] : Latricia Baxter NP\par [address] [place] [address]\[place] [address] [FreeTextEntry1] : \par \par Enclosed please find my office notes for April 5, 2019. \par \par \par \par  [FreeTextEntry3] : \par Natalie Trejo MD \par Otolaryngology, Head and Neck Surgery \par Residency site , [facility]\par

## 2019-04-05 NOTE — HISTORY OF PRESENT ILLNESS
[de-identified] : Mr. Street reports that he no longer has left throat pain, only feels a discomfort at times throughout the day.\par The throat pain has steadily improved after serial cautery of the lateral pharyngeal wall lymphoid tissue adjacent to left tonsillectomy site. \par This winter, he did not have his usual sore throat and severe URI.\par S/p left tonsillectomy for chronic tonsillitis on August 28, 2018.\par \par Bilateral tinnitus is still present but he lives with it.\par No change in hearing.

## 2019-05-08 ENCOUNTER — APPOINTMENT (OUTPATIENT)
Dept: OTOLARYNGOLOGY | Facility: CLINIC | Age: 64
End: 2019-05-08

## 2021-03-20 ENCOUNTER — EMERGENCY (EMERGENCY)
Facility: HOSPITAL | Age: 66
LOS: 1 days | Discharge: HOME | End: 2021-03-20
Admitting: EMERGENCY MEDICINE
Payer: MEDICARE

## 2021-03-20 VITALS
HEIGHT: 66 IN | DIASTOLIC BLOOD PRESSURE: 80 MMHG | WEIGHT: 210.1 LBS | RESPIRATION RATE: 18 BRPM | TEMPERATURE: 99 F | HEART RATE: 66 BPM | OXYGEN SATURATION: 95 % | SYSTOLIC BLOOD PRESSURE: 123 MMHG

## 2021-03-20 VITALS
TEMPERATURE: 99 F | RESPIRATION RATE: 16 BRPM | OXYGEN SATURATION: 96 % | SYSTOLIC BLOOD PRESSURE: 120 MMHG | DIASTOLIC BLOOD PRESSURE: 80 MMHG | HEART RATE: 61 BPM

## 2021-03-20 DIAGNOSIS — L02.31 CUTANEOUS ABSCESS OF BUTTOCK: ICD-10-CM

## 2021-03-20 DIAGNOSIS — Z79.2 LONG TERM (CURRENT) USE OF ANTIBIOTICS: ICD-10-CM

## 2021-03-20 DIAGNOSIS — L05.01 PILONIDAL CYST WITH ABSCESS: ICD-10-CM

## 2021-03-20 PROCEDURE — 10080 I&D PILONIDAL CYST SIMPLE: CPT

## 2021-03-20 PROCEDURE — 99284 EMERGENCY DEPT VISIT MOD MDM: CPT | Mod: 25

## 2021-03-20 RX ORDER — CEPHALEXIN 500 MG
1 CAPSULE ORAL
Qty: 28 | Refills: 0
Start: 2021-03-20 | End: 2021-03-26

## 2021-03-20 NOTE — ED PROVIDER NOTE - OBJECTIVE STATEMENT
66 y/o M presents to ED c/o abscess to buttocks x 4 days.  It started draining yesterday.  He reports having similar symptoms approx 5 years ago and had it drained.  He has not had any problems in the area since.  He denies fevers/chills, weakness, numbness.

## 2021-03-20 NOTE — ED PROVIDER NOTE - NSFOLLOWUPINSTRUCTIONS_ED_ALL_ED_FT
WHAT YOU NEED TO KNOW:    A pilonidal cyst is a small sac under the skin. Pilonidal cysts may become infected and cause an abscess (collection of pus).    DISCHARGE INSTRUCTIONS:    Contact your healthcare provider if:   •You have a fever.   •Your cyst is red and swollen.  •Your cyst has pus draining from it.  •You have questions or concerns about your condition or care.    Prevent an infection:   •Shave around the cyst. This will prevent hairs from entering the cyst. Your healthcare provider may recommend laser hair removal.     •Clean the cyst area as directed. Your healthcare provider may recommend that you use a mild soap and rinse it well.   •Do not sit for long periods of time. This may cause the cyst to get irritated.      TAKE ANTIBIOTIC AS PRESCRIBED UNTIL COMPLETE.  RETURN FOR INCREASED PAIN, FEVER, VOMITING OR OTHER CONCERNS

## 2021-03-20 NOTE — ED ADULT NURSE NOTE - TEMPLATE LIST FOR HEAD TO TOE ASSESSMENT
Impression: Pinguecula, bilateral: H11.153. OU. Plan: Discussed diagnosis in detail with patient. No treatment is required at this time. Will continue to observe condition and or symptoms. Wounds

## 2021-03-20 NOTE — ED PROVIDER NOTE - PATIENT PORTAL LINK FT
You can access the FollowMyHealth Patient Portal offered by [facility] by registering at the following website: [email or web address]. By joining [employer]’s FollowMyHealth portal, you will also be able to view your health information using other applications (apps) compatible with our system.

## 2021-03-20 NOTE — ED PROVIDER NOTE - CLINICAL SUMMARY MEDICAL DECISION MAKING FREE TEXT BOX
66 y/o M presents to ED with pilonidal cyst with surrounding erythema. Pt well appearing, VSS, NAD.  I&D performed.  wound culture obtained and sent.  Will Rx Keflex.  Return precautions discussed.

## 2021-03-22 LAB
CULTURE RESULTS: SIGNIFICANT CHANGE UP
SPECIMEN SOURCE: SIGNIFICANT CHANGE UP

## 2021-08-23 NOTE — ED PROVIDER NOTE - CHIEF COMPLAINT
August 23, 2021         Patient: Abimbola Munguia   YOB: 1996   Date of Visit: 8/23/2021           To Whom it May Concern:    Concern for COVID-19 illness has been identified and testing is in progress.  The results are available through our electronic delivery system called [employer].    We are asking employers to excuse absence while they follow self isolation protocol per CDC guidelines    • At least 10 days since symptoms first appeared AND  • At least 24 hours with no fever greater than 100.4 without fever reducing medication AND  • Symptoms have improved.     If results are negative, you must continue to follow the self-isolation protocol. You may return to work when you have no fever for at least 24 hours without the use of fever-reducing medication, and symptoms have improved.     If the results of testing are positive then you will be contacted by your [facility] department for further instructions on duration of self-isolation and return to work. In general, this will also follow the CDC guidelines with minimum of 10 days from the onset of symptoms, and symptoms are improving without fever.       This is the only note that will be provided from [facility] the [facility] for this visit. Please schedule a visit with a primary care provider if FMLA, disability, or unemployment paperwork is required.       Sincerely,    Savanna Feliciano, P.A.-C.  [phone]\    Electronically Signed     
The patient is a 63y Male complaining of syncope.

## 2022-09-19 ENCOUNTER — APPOINTMENT (OUTPATIENT)
Dept: OTOLARYNGOLOGY | Facility: CLINIC | Age: 67
End: 2022-09-19

## 2023-03-21 VITALS
SYSTOLIC BLOOD PRESSURE: 148 MMHG | HEIGHT: 66 IN | TEMPERATURE: 98 F | RESPIRATION RATE: 16 BRPM | HEART RATE: 53 BPM | DIASTOLIC BLOOD PRESSURE: 71 MMHG | WEIGHT: 205.03 LBS | OXYGEN SATURATION: 97 %

## 2023-03-21 RX ORDER — CHLORHEXIDINE GLUCONATE 213 G/1000ML
1 SOLUTION TOPICAL ONCE
Refills: 0 | Status: DISCONTINUED | OUTPATIENT
Start: 2023-03-24 | End: 2023-04-07

## 2023-03-21 NOTE — H&P ADULT - NSICDXPASTMEDICALHX_GEN_ALL_CORE_FT
PAST MEDICAL HISTORY:  GERD (gastroesophageal reflux disease)     Hypercholesterolemia     Sinusitis     Vasovagal syncope      PAST MEDICAL HISTORY:  GERD (gastroesophageal reflux disease)     HSV infection     Hypercholesterolemia     Sinusitis     Vasovagal syncope

## 2023-03-21 NOTE — H&P ADULT - NECK
no carotiod bruits b/l/normal/supple/symmetric/no tracheal deviation/no thyromegaly/no palpable masses

## 2023-03-21 NOTE — H&P ADULT - ASSESSMENT
68 y/o M, non-smoker, strong FHX for CAD, PMHx HLD, GERD, remote PE (9/2017), initially presented to Dr Katz's office for routine check up for his CAD, with subsequent abnormal NST and CCTA, now presents for cardiac cath with possible intervention if clinically indicated.    				  ASA _II____				Mallampati class: ___II______	                A/P:        Sedation Plan:  Moderate      Patient Is Suitable Candidate For Sedation: Yes        Risks & benefits of procedure and sedation and risks and benefits for the alternative therapy have been explained to the patient in layman’s terms including but not limited to: allergic reaction, bleeding, infection, arrhythmia, respiratory compromise, renal and vascular compromise, limb damage, MI, CVA, emergent CABG/Vascular Surgery and death.     -Informed consent obtained and in chart.  -meds confirmed with the patient  -IVF: NS 250cc bolus  -ASA/Plavix load (h/h - 16.3/46.4)

## 2023-03-21 NOTE — H&P ADULT - NSHPLABSRESULTS_GEN_ALL_CORE
ECG: pending    Labs:                          16.3   7.05  )-----------( 214      ( 24 Mar 2023 07:40 )             46.4       Auto Neutrophil %: 45.3 % (03-24-23 @ 07:40)  Auto Immature Granulocyte %: 0.4 % (03-24-23 @ 07:40)       Coags:     11.5   ----< 0.97    ( 24 Mar 2023 07:40 )     27.8 ECG: sinus francisco @ 50bpm, QTc 399    Labs:                          16.3   7.05  )-----------( 214      ( 24 Mar 2023 07:40 )             46.4       Auto Neutrophil %: 45.3 % (03-24-23 @ 07:40)  Auto Immature Granulocyte %: 0.4 % (03-24-23 @ 07:40)       Coags:     11.5   ----< 0.97    ( 24 Mar 2023 07:40 )     27.8 ECG: sinus francisco @ 50bpm, QTc 399    Labs:                          16.3   7.05  )-----------( 214      ( 24 Mar 2023 07:40 )             46.4       Auto Neutrophil %: 45.3 % (03-24-23 @ 07:40)  Auto Immature Granulocyte %: 0.4 % (03-24-23 @ 07:40)    03-24    140  |  105  |  13  ----------------------------<  118<H>  4.1   |  24  |  0.99      Magnesium, Serum: 1.9 mg/dL (03-24-23 @ 07:40)      LFTs:             6.9  | 0.9  | 25       ------------------[54      ( 24 Mar 2023 07:40 )  4.2  | x    | 26             Coags:     11.5   ----< 0.97    ( 24 Mar 2023 07:40 )     27.8

## 2023-03-21 NOTE — H&P ADULT - NSICDXPASTSURGICALHX_GEN_ALL_CORE_FT
PAST SURGICAL HISTORY:  No significant past surgical history      PAST SURGICAL HISTORY:  History of tonsillectomy

## 2023-03-21 NOTE — H&P ADULT - HISTORY OF PRESENT ILLNESS
Covid PCR  Pharmacy  Escort    68 y/o M, non-smoker, strong FHX CAD, PMHx HLD, GERD, HSV (on Valtrex), remote PE (2017) initially presented to Dr Katz's office for routine check up for his CAD.  Per pt, he recently had a friend who  from a heart attack and was concerned that he is overweight and that his Lipids levels are high.  Pt does endorse that he thinks his GERD symptoms, neck pain and sore throat are getting better.   ***** Pt denies active CP, SOB, palpitations, diaphoresis, orthopnea, PND, dizziness, syncope, abdominal pain/discomfort, LE swelling or any other complaints at this time****    CCTA 3/2023:  revealing calcified plaque in pLAD 50-69% stenosis (may be overestimated blooming.  No evidence of obstructive Dz in the remainder right dominant coronary arteries.  Extensive burden of coronary calcium, 1689.  Normal cardiac morphology, normal thoracic aorta/pulmonary vasculature and systemic veins.   TTE 2023:  EF 56%, Mild dilated LA, Trivial MR/TR/NM.  Mildly thickened/sclerotic AV cusp.  ETT 2023:  Moderate exercise tolerance.   Maximal HR response to stress.  Hypertensive response to stress.   Asx, normal stress test by EKG criteria.     In light of the pt's risk factors and recent abnormal CCTA pt now presents for recommended cardiac catheterization with possible intervention with Dr Heller.           Covid PCR: 3/21 Neg (results in chart)  Pharmacy: [facility] on [address]  Escort: no one    66 y/o M, non-smoker, strong FHX for CAD, PMHx HLD, GERD, remote PE (2017), initially presented to Dr Katz's office for routine check up for his CAD.  Per pt, he recently had a friend who  from a heart attack and was concerned that he is overweight and that his Lipids levels are high.  Pt does endorse that he thinks his GERD symptoms, neck pain and sore throat are getting better.   Pt denies active CP, SOB, palpitations, diaphoresis, orthopnea, PND, dizziness, syncope, abdominal pain/discomfort, LE swelling or any other complaints at this time.    CCTA 3/2023:  revealing calcified plaque in pLAD 50-69% stenosis (may be overestimated blooming.  No evidence of obstructive Dz in the remainder right dominant coronary arteries.  Extensive burden of coronary calcium, 1689.  Normal cardiac morphology, normal thoracic aorta/pulmonary vasculature and systemic veins.   TTE 2023:  EF 56%, Mild dilated LA, Trivial MR/TR/CA.  Mildly thickened/sclerotic AV cusp.  ETT 2023:  Moderate exercise tolerance.   Maximal HR response to stress.  Hypertensive response to stress.   Asx, normal stress test by EKG criteria.     In light of the pt's risk factors and recent abnormal CCTA, pt now presents for recommended cardiac catheterization with possible intervention if clinically indicated.

## 2023-03-24 ENCOUNTER — OUTPATIENT (OUTPATIENT)
Dept: OUTPATIENT SERVICES | Facility: HOSPITAL | Age: 68
LOS: 1 days | Discharge: HOME | End: 2023-03-24
Payer: MEDICARE

## 2023-03-24 DIAGNOSIS — Z90.89 ACQUIRED ABSENCE OF OTHER ORGANS: Chronic | ICD-10-CM

## 2023-03-24 LAB
A1C WITH ESTIMATED AVERAGE GLUCOSE RESULT: 5.7 % — HIGH (ref 4–5.6)
ALBUMIN SERPL ELPH-MCNC: 4.2 G/DL — SIGNIFICANT CHANGE UP (ref 3.3–5)
ALP SERPL-CCNC: 54 U/L — SIGNIFICANT CHANGE UP (ref 40–120)
ALT FLD-CCNC: 26 U/L — SIGNIFICANT CHANGE UP (ref 10–45)
ANION GAP SERPL CALC-SCNC: 11 MMOL/L — SIGNIFICANT CHANGE UP (ref 5–17)
APTT BLD: 27.8 SEC — SIGNIFICANT CHANGE UP (ref 27.5–35.5)
AST SERPL-CCNC: 25 U/L — SIGNIFICANT CHANGE UP (ref 10–40)
BASOPHILS # BLD AUTO: 0.09 K/UL — SIGNIFICANT CHANGE UP (ref 0–0.2)
BASOPHILS NFR BLD AUTO: 1.3 % — SIGNIFICANT CHANGE UP (ref 0–2)
BILIRUB SERPL-MCNC: 0.9 MG/DL — SIGNIFICANT CHANGE UP (ref 0.2–1.2)
BUN SERPL-MCNC: 13 MG/DL — SIGNIFICANT CHANGE UP (ref 7–23)
CALCIUM SERPL-MCNC: 9.3 MG/DL — SIGNIFICANT CHANGE UP (ref 8.4–10.5)
CHLORIDE SERPL-SCNC: 105 MMOL/L — SIGNIFICANT CHANGE UP (ref 96–108)
CHOLEST SERPL-MCNC: 141 MG/DL — SIGNIFICANT CHANGE UP
CO2 SERPL-SCNC: 24 MMOL/L — SIGNIFICANT CHANGE UP (ref 22–31)
CREAT SERPL-MCNC: 0.99 MG/DL — SIGNIFICANT CHANGE UP (ref 0.5–1.3)
EGFR: 83 ML/MIN/1.73M2 — SIGNIFICANT CHANGE UP
EOSINOPHIL # BLD AUTO: 0.3 K/UL — SIGNIFICANT CHANGE UP (ref 0–0.5)
EOSINOPHIL NFR BLD AUTO: 4.3 % — SIGNIFICANT CHANGE UP (ref 0–6)
ESTIMATED AVERAGE GLUCOSE: 117 MG/DL — HIGH (ref 68–114)
GLUCOSE SERPL-MCNC: 118 MG/DL — HIGH (ref 70–99)
HCT VFR BLD CALC: 46.4 % — SIGNIFICANT CHANGE UP (ref 39–50)
HDLC SERPL-MCNC: 46 MG/DL — SIGNIFICANT CHANGE UP
HGB BLD-MCNC: 16.3 G/DL — SIGNIFICANT CHANGE UP (ref 13–17)
IMM GRANULOCYTES NFR BLD AUTO: 0.4 % — SIGNIFICANT CHANGE UP (ref 0–0.9)
INR BLD: 0.97 — SIGNIFICANT CHANGE UP (ref 0.88–1.16)
ISTAT ACTK (ACTIVATED CLOTTING TIME KAOLIN): 263 SEC — HIGH (ref 74–137)
ISTAT ACTK (ACTIVATED CLOTTING TIME KAOLIN): 329 SEC — HIGH (ref 74–137)
LIPID PNL WITH DIRECT LDL SERPL: 57 MG/DL — SIGNIFICANT CHANGE UP
LYMPHOCYTES # BLD AUTO: 2.77 K/UL — SIGNIFICANT CHANGE UP (ref 1–3.3)
LYMPHOCYTES # BLD AUTO: 39.3 % — SIGNIFICANT CHANGE UP (ref 13–44)
MAGNESIUM SERPL-MCNC: 1.9 MG/DL — SIGNIFICANT CHANGE UP (ref 1.6–2.6)
MCHC RBC-ENTMCNC: 30.4 PG — SIGNIFICANT CHANGE UP (ref 27–34)
MCHC RBC-ENTMCNC: 35.1 GM/DL — SIGNIFICANT CHANGE UP (ref 32–36)
MCV RBC AUTO: 86.6 FL — SIGNIFICANT CHANGE UP (ref 80–100)
MONOCYTES # BLD AUTO: 0.66 K/UL — SIGNIFICANT CHANGE UP (ref 0–0.9)
MONOCYTES NFR BLD AUTO: 9.4 % — SIGNIFICANT CHANGE UP (ref 2–14)
NEUTROPHILS # BLD AUTO: 3.2 K/UL — SIGNIFICANT CHANGE UP (ref 1.8–7.4)
NEUTROPHILS NFR BLD AUTO: 45.3 % — SIGNIFICANT CHANGE UP (ref 43–77)
NON HDL CHOLESTEROL: 95 MG/DL — SIGNIFICANT CHANGE UP
NRBC # BLD: 0 /100 WBCS — SIGNIFICANT CHANGE UP (ref 0–0)
PLATELET # BLD AUTO: 214 K/UL — SIGNIFICANT CHANGE UP (ref 150–400)
POTASSIUM SERPL-MCNC: 4.1 MMOL/L — SIGNIFICANT CHANGE UP (ref 3.5–5.3)
POTASSIUM SERPL-SCNC: 4.1 MMOL/L — SIGNIFICANT CHANGE UP (ref 3.5–5.3)
PROT SERPL-MCNC: 6.9 G/DL — SIGNIFICANT CHANGE UP (ref 6–8.3)
PROTHROM AB SERPL-ACNC: 11.5 SEC — SIGNIFICANT CHANGE UP (ref 10.5–13.4)
RBC # BLD: 5.36 M/UL — SIGNIFICANT CHANGE UP (ref 4.2–5.8)
RBC # FLD: 11.6 % — SIGNIFICANT CHANGE UP (ref 10.3–14.5)
SODIUM SERPL-SCNC: 140 MMOL/L — SIGNIFICANT CHANGE UP (ref 135–145)
TRIGL SERPL-MCNC: 188 MG/DL — HIGH
WBC # BLD: 7.05 K/UL — SIGNIFICANT CHANGE UP (ref 3.8–10.5)
WBC # FLD AUTO: 7.05 K/UL — SIGNIFICANT CHANGE UP (ref 3.8–10.5)

## 2023-03-24 PROCEDURE — 99152 MOD SED SAME PHYS/QHP 5/>YRS: CPT

## 2023-03-24 PROCEDURE — C1874: CPT

## 2023-03-24 PROCEDURE — 93005 ELECTROCARDIOGRAM TRACING: CPT

## 2023-03-24 PROCEDURE — 93010 ELECTROCARDIOGRAM REPORT: CPT

## 2023-03-24 PROCEDURE — 85730 THROMBOPLASTIN TIME PARTIAL: CPT

## 2023-03-24 PROCEDURE — 85025 COMPLETE CBC W/AUTO DIFF WBC: CPT

## 2023-03-24 PROCEDURE — C9602: CPT | Mod: LD

## 2023-03-24 PROCEDURE — 92933 PRQ TRLML C ATHRC ST ANGIOP1: CPT | Mod: LD

## 2023-03-24 PROCEDURE — 83036 HEMOGLOBIN GLYCOSYLATED A1C: CPT

## 2023-03-24 PROCEDURE — C1724: CPT

## 2023-03-24 PROCEDURE — 92978 ENDOLUMINL IVUS OCT C 1ST: CPT | Mod: LD

## 2023-03-24 PROCEDURE — C1769: CPT

## 2023-03-24 PROCEDURE — 83735 ASSAY OF MAGNESIUM: CPT

## 2023-03-24 PROCEDURE — 80061 LIPID PANEL: CPT

## 2023-03-24 PROCEDURE — 85610 PROTHROMBIN TIME: CPT

## 2023-03-24 PROCEDURE — 93458 L HRT ARTERY/VENTRICLE ANGIO: CPT | Mod: 26,XU

## 2023-03-24 PROCEDURE — C1753: CPT

## 2023-03-24 PROCEDURE — 99153 MOD SED SAME PHYS/QHP EA: CPT

## 2023-03-24 PROCEDURE — C1887: CPT

## 2023-03-24 PROCEDURE — C1894: CPT

## 2023-03-24 PROCEDURE — 85347 COAGULATION TIME ACTIVATED: CPT

## 2023-03-24 PROCEDURE — 92978 ENDOLUMINL IVUS OCT C 1ST: CPT | Mod: 26,LD

## 2023-03-24 PROCEDURE — 93458 L HRT ARTERY/VENTRICLE ANGIO: CPT | Mod: XU

## 2023-03-24 PROCEDURE — 80053 COMPREHEN METABOLIC PANEL: CPT

## 2023-03-24 PROCEDURE — C1725: CPT

## 2023-03-24 RX ORDER — ASPIRIN/CALCIUM CARB/MAGNESIUM 324 MG
325 TABLET ORAL ONCE
Refills: 0 | Status: COMPLETED | OUTPATIENT
Start: 2023-03-24 | End: 2023-03-24

## 2023-03-24 RX ORDER — CLOPIDOGREL BISULFATE 75 MG/1
75 TABLET, FILM COATED ORAL ONCE
Refills: 0 | Status: DISCONTINUED | OUTPATIENT
Start: 2023-03-24 | End: 2023-03-24

## 2023-03-24 RX ORDER — ASPIRIN/CALCIUM CARB/MAGNESIUM 324 MG
1 TABLET ORAL
Qty: 0 | Refills: 0 | DISCHARGE

## 2023-03-24 RX ORDER — CLOPIDOGREL BISULFATE 75 MG/1
600 TABLET, FILM COATED ORAL ONCE
Refills: 0 | Status: COMPLETED | OUTPATIENT
Start: 2023-03-24 | End: 2023-03-24

## 2023-03-24 RX ORDER — ROSUVASTATIN CALCIUM 5 MG/1
1 TABLET ORAL
Qty: 30 | Refills: 3
Start: 2023-03-24 | End: 2023-07-21

## 2023-03-24 RX ORDER — ATORVASTATIN CALCIUM 80 MG/1
40 TABLET, FILM COATED ORAL DAILY
Refills: 0 | Status: DISCONTINUED | OUTPATIENT
Start: 2023-03-24 | End: 2023-04-07

## 2023-03-24 RX ORDER — CLOPIDOGREL BISULFATE 75 MG/1
75 TABLET, FILM COATED ORAL DAILY
Refills: 0 | Status: DISCONTINUED | OUTPATIENT
Start: 2023-03-25 | End: 2023-04-07

## 2023-03-24 RX ORDER — SODIUM CHLORIDE 9 MG/ML
250 INJECTION INTRAMUSCULAR; INTRAVENOUS; SUBCUTANEOUS ONCE
Refills: 0 | Status: COMPLETED | OUTPATIENT
Start: 2023-03-24 | End: 2023-03-24

## 2023-03-24 RX ORDER — ASPIRIN/CALCIUM CARB/MAGNESIUM 324 MG
81 TABLET ORAL ONCE
Refills: 0 | Status: DISCONTINUED | OUTPATIENT
Start: 2023-03-24 | End: 2023-03-24

## 2023-03-24 RX ORDER — SODIUM CHLORIDE 9 MG/ML
1000 INJECTION INTRAMUSCULAR; INTRAVENOUS; SUBCUTANEOUS
Refills: 0 | Status: DISCONTINUED | OUTPATIENT
Start: 2023-03-24 | End: 2023-04-07

## 2023-03-24 RX ORDER — PANTOPRAZOLE SODIUM 20 MG/1
40 TABLET, DELAYED RELEASE ORAL ONCE
Refills: 0 | Status: DISCONTINUED | OUTPATIENT
Start: 2023-03-24 | End: 2023-03-24

## 2023-03-24 RX ORDER — PANTOPRAZOLE SODIUM 20 MG/1
40 TABLET, DELAYED RELEASE ORAL DAILY
Refills: 0 | Status: DISCONTINUED | OUTPATIENT
Start: 2023-03-25 | End: 2023-04-07

## 2023-03-24 RX ORDER — SODIUM CHLORIDE 9 MG/ML
500 INJECTION INTRAMUSCULAR; INTRAVENOUS; SUBCUTANEOUS
Refills: 0 | Status: DISCONTINUED | OUTPATIENT
Start: 2023-03-24 | End: 2023-04-07

## 2023-03-24 RX ORDER — VALACYCLOVIR 500 MG/1
1 TABLET, FILM COATED ORAL
Qty: 0 | Refills: 0 | DISCHARGE

## 2023-03-24 RX ORDER — ATORVASTATIN CALCIUM 80 MG/1
40 TABLET, FILM COATED ORAL ONCE
Refills: 0 | Status: DISCONTINUED | OUTPATIENT
Start: 2023-03-24 | End: 2023-03-24

## 2023-03-24 RX ORDER — ASPIRIN/CALCIUM CARB/MAGNESIUM 324 MG
81 TABLET ORAL DAILY
Refills: 0 | Status: DISCONTINUED | OUTPATIENT
Start: 2023-03-25 | End: 2023-04-07

## 2023-03-24 RX ORDER — ASPIRIN/CALCIUM CARB/MAGNESIUM 324 MG
1 TABLET ORAL
Qty: 30 | Refills: 11
Start: 2023-03-24 | End: 2024-03-17

## 2023-03-24 RX ORDER — CLOPIDOGREL BISULFATE 75 MG/1
1 TABLET, FILM COATED ORAL
Qty: 30 | Refills: 11
Start: 2023-03-24 | End: 2024-03-17

## 2023-03-24 RX ORDER — ROSUVASTATIN CALCIUM 5 MG/1
1 TABLET ORAL
Qty: 0 | Refills: 0 | DISCHARGE

## 2023-03-24 RX ORDER — PANTOPRAZOLE SODIUM 20 MG/1
1 TABLET, DELAYED RELEASE ORAL
Qty: 0 | Refills: 0 | DISCHARGE

## 2023-03-24 RX ADMIN — SODIUM CHLORIDE 500 MILLILITER(S): 9 INJECTION INTRAMUSCULAR; INTRAVENOUS; SUBCUTANEOUS at 09:01

## 2023-03-24 RX ADMIN — Medication 325 MILLIGRAM(S): at 09:04

## 2023-03-24 RX ADMIN — CLOPIDOGREL BISULFATE 600 MILLIGRAM(S): 75 TABLET, FILM COATED ORAL at 09:03

## 2023-03-24 RX ADMIN — SODIUM CHLORIDE 250 MILLILITER(S): 9 INJECTION INTRAMUSCULAR; INTRAVENOUS; SUBCUTANEOUS at 13:31

## 2023-03-24 NOTE — PROGRESS NOTE ADULT - SUBJECTIVE AND OBJECTIVE BOX
Interventional Cardiology PA Post PCI SDA Discharge Note    Patient without complaints. Ambulated and voided without difficulties    Afebrile, VSS    PRESCRIPTIONS/HOME MEDICATIONS:  Aspirin Enteric Coated 81 mg oral delayed release tablet: 1 tab(s) orally once a day   Aspirin Enteric Coated 81 mg oral delayed release tablet: 1 tab(s) orally once a day  pantoprazole 40 mg oral delayed release tablet: 1 tab(s) orally once a day  Plavix 75 mg oral tablet: 1 tab(s) orally once a day   rosuvastatin 10 mg oral tablet: 1 tab(s) orally once a day    CURRENT MEDICATIONS:   atorvastatin 40 milliGRAM(s) Oral daily  chlorhexidine 4% Liquid 1 Application(s) Topical once  sodium chloride 0.9%. 1000 milliLiter(s) IV Continuous <Continuous>  sodium chloride 0.9%. 500 milliLiter(s) IV Continuous <Continuous>    Ext: Right Radial : no hematoma, no bleeding, dressing; C/D/I    Pulses: intact RAD to baseline     A/P: s/p cardiac cath 3/24/2023: ARISTIDES x 2 pLAD (90%) severely calcified; EDP: 6, EF: 56% as per echo.    1. Follow-up with PMD/Cardiologist Dr. Katz in 72 hours.  2. Post procedure labs/EKG reviewed and stable.    3. Pt given instructions on importance of taking antiplatelet medication.    4. Pt refused to be admitted and refused to stay for repeat labs/ECG, post cath hydration   5. Prescriptions for Aspirin/Plavix e-prescribed and submitted to patient's pharmacy Yes  6. Patient will continue Crestor 20mg which was increased from home Crestor 10mg.   7. Patient will continue All Other Home Medications.  8. Discharge forms signed and copies in chart.   9. Discharge Order Entered Yes  10. Pt signed AMA form and it was placed in the chart.

## 2023-03-31 DIAGNOSIS — R94.39 ABNORMAL RESULT OF OTHER CARDIOVASCULAR FUNCTION STUDY: ICD-10-CM

## 2023-03-31 DIAGNOSIS — I25.110 ATHEROSCLEROTIC HEART DISEASE OF NATIVE CORONARY ARTERY WITH UNSTABLE ANGINA PECTORIS: ICD-10-CM

## 2023-03-31 DIAGNOSIS — I25.84 CORONARY ATHEROSCLEROSIS DUE TO CALCIFIED CORONARY LESION: ICD-10-CM

## 2023-05-12 PROBLEM — B00.9 HERPESVIRAL INFECTION, UNSPECIFIED: Chronic | Status: ACTIVE | Noted: 2023-03-24

## 2023-05-12 PROBLEM — E78.00 PURE HYPERCHOLESTEROLEMIA, UNSPECIFIED: Chronic | Status: ACTIVE | Noted: 2023-03-21

## 2023-05-12 PROBLEM — K21.9 GASTRO-ESOPHAGEAL REFLUX DISEASE WITHOUT ESOPHAGITIS: Chronic | Status: ACTIVE | Noted: 2023-03-21

## 2023-06-22 ENCOUNTER — EMERGENCY (EMERGENCY)
Facility: HOSPITAL | Age: 68
LOS: 1 days | Discharge: HOME | End: 2023-06-22
Attending: EMERGENCY MEDICINE | Admitting: EMERGENCY MEDICINE
Payer: MEDICARE

## 2023-06-22 VITALS — RESPIRATION RATE: 18 BRPM | HEART RATE: 49 BPM | OXYGEN SATURATION: 97 %

## 2023-06-22 VITALS
DIASTOLIC BLOOD PRESSURE: 78 MMHG | SYSTOLIC BLOOD PRESSURE: 128 MMHG | HEART RATE: 60 BPM | TEMPERATURE: 98 F | OXYGEN SATURATION: 95 % | WEIGHT: 210.98 LBS | RESPIRATION RATE: 18 BRPM

## 2023-06-22 DIAGNOSIS — Z20.822 CONTACT WITH AND (SUSPECTED) EXPOSURE TO COVID-19: ICD-10-CM

## 2023-06-22 DIAGNOSIS — H10.9 UNSPECIFIED CONJUNCTIVITIS: ICD-10-CM

## 2023-06-22 DIAGNOSIS — Z90.89 ACQUIRED ABSENCE OF OTHER ORGANS: Chronic | ICD-10-CM

## 2023-06-22 DIAGNOSIS — Z79.82 LONG TERM (CURRENT) USE OF ASPIRIN: ICD-10-CM

## 2023-06-22 DIAGNOSIS — Z95.5 PRESENCE OF CORONARY ANGIOPLASTY IMPLANT AND GRAFT: ICD-10-CM

## 2023-06-22 DIAGNOSIS — E78.00 PURE HYPERCHOLESTEROLEMIA, UNSPECIFIED: ICD-10-CM

## 2023-06-22 DIAGNOSIS — I25.10 ATHEROSCLEROTIC HEART DISEASE OF NATIVE CORONARY ARTERY WITHOUT ANGINA PECTORIS: ICD-10-CM

## 2023-06-22 DIAGNOSIS — J06.9 ACUTE UPPER RESPIRATORY INFECTION, UNSPECIFIED: ICD-10-CM

## 2023-06-22 DIAGNOSIS — Z87.19 PERSONAL HISTORY OF OTHER DISEASES OF THE DIGESTIVE SYSTEM: ICD-10-CM

## 2023-06-22 DIAGNOSIS — Z86.19 PERSONAL HISTORY OF OTHER INFECTIOUS AND PARASITIC DISEASES: ICD-10-CM

## 2023-06-22 DIAGNOSIS — Z87.09 PERSONAL HISTORY OF OTHER DISEASES OF THE RESPIRATORY SYSTEM: ICD-10-CM

## 2023-06-22 DIAGNOSIS — Z90.09 ACQUIRED ABSENCE OF OTHER PART OF HEAD AND NECK: ICD-10-CM

## 2023-06-22 DIAGNOSIS — Z95.5 PRESENCE OF CORONARY ANGIOPLASTY IMPLANT AND GRAFT: Chronic | ICD-10-CM

## 2023-06-22 DIAGNOSIS — R53.83 OTHER FATIGUE: ICD-10-CM

## 2023-06-22 DIAGNOSIS — Z79.02 LONG TERM (CURRENT) USE OF ANTITHROMBOTICS/ANTIPLATELETS: ICD-10-CM

## 2023-06-22 LAB
FLUAV H1 2009 PAND RNA SPEC QL NAA+PROBE: SIGNIFICANT CHANGE UP
FLUAV H1 RNA SPEC QL NAA+PROBE: SIGNIFICANT CHANGE UP
FLUAV H3 RNA SPEC QL NAA+PROBE: SIGNIFICANT CHANGE UP
FLUAV SUBTYP SPEC NAA+PROBE: SIGNIFICANT CHANGE UP
FLUBV RNA SPEC QL NAA+PROBE: SIGNIFICANT CHANGE UP
HADV DNA SPEC QL NAA+PROBE: SIGNIFICANT CHANGE UP
HCOV PNL SPEC NAA+PROBE: SIGNIFICANT CHANGE UP
HMPV RNA SPEC QL NAA+PROBE: SIGNIFICANT CHANGE UP
HPIV1 RNA SPEC QL NAA+PROBE: SIGNIFICANT CHANGE UP
HPIV2 RNA SPEC QL NAA+PROBE: SIGNIFICANT CHANGE UP
HPIV3 RNA SPEC QL NAA+PROBE: SIGNIFICANT CHANGE UP
HPIV4 RNA SPEC QL NAA+PROBE: SIGNIFICANT CHANGE UP
RAPID RVP RESULT: SIGNIFICANT CHANGE UP
RSV RNA SPEC QL NAA+PROBE: SIGNIFICANT CHANGE UP
RV+EV RNA SPEC QL NAA+PROBE: SIGNIFICANT CHANGE UP
SARS-COV-2 RNA SPEC QL NAA+PROBE: SIGNIFICANT CHANGE UP

## 2023-06-22 PROCEDURE — 99284 EMERGENCY DEPT VISIT MOD MDM: CPT

## 2023-06-22 PROCEDURE — 71046 X-RAY EXAM CHEST 2 VIEWS: CPT | Mod: 26

## 2023-06-22 RX ORDER — PSEUDOEPHEDRINE HCL 30 MG
30 TABLET ORAL ONCE
Refills: 0 | Status: COMPLETED | OUTPATIENT
Start: 2023-06-22 | End: 2023-06-22

## 2023-06-22 RX ORDER — ERYTHROMYCIN BASE 5 MG/GRAM
1 OINTMENT (GRAM) OPHTHALMIC (EYE)
Qty: 1 | Refills: 0
Start: 2023-06-22 | End: 2023-06-26

## 2023-06-22 RX ORDER — AZITHROMYCIN 500 MG/1
500 TABLET, FILM COATED ORAL ONCE
Refills: 0 | Status: COMPLETED | OUTPATIENT
Start: 2023-06-22 | End: 2023-06-22

## 2023-06-22 RX ORDER — OXYMETAZOLINE HYDROCHLORIDE 0.5 MG/ML
2 SPRAY NASAL ONCE
Refills: 0 | Status: COMPLETED | OUTPATIENT
Start: 2023-06-22 | End: 2023-06-22

## 2023-06-22 RX ORDER — ACETAMINOPHEN 500 MG
975 TABLET ORAL ONCE
Refills: 0 | Status: COMPLETED | OUTPATIENT
Start: 2023-06-22 | End: 2023-06-22

## 2023-06-22 RX ORDER — ERYTHROMYCIN BASE 5 MG/GRAM
1 OINTMENT (GRAM) OPHTHALMIC (EYE) ONCE
Refills: 0 | Status: COMPLETED | OUTPATIENT
Start: 2023-06-22 | End: 2023-06-22

## 2023-06-22 RX ORDER — PSEUDOEPHEDRINE HCL 30 MG
1 TABLET ORAL
Qty: 12 | Refills: 0
Start: 2023-06-22

## 2023-06-22 RX ADMIN — OXYMETAZOLINE HYDROCHLORIDE 2 SPRAY(S): 0.5 SPRAY NASAL at 09:42

## 2023-06-22 RX ADMIN — Medication 1 APPLICATION(S): at 09:41

## 2023-06-22 RX ADMIN — AZITHROMYCIN 500 MILLIGRAM(S): 500 TABLET, FILM COATED ORAL at 11:23

## 2023-06-22 RX ADMIN — Medication 975 MILLIGRAM(S): at 09:41

## 2023-06-22 RX ADMIN — Medication 30 MILLIGRAM(S): at 09:41

## 2023-06-22 NOTE — ED PROVIDER NOTE - PATIENT PORTAL LINK FT
You can access the FollowMyHealth Patient Portal offered by [facility] by registering at the following website: [email or web address]. By joining [employer]’s FollowMyHealth portal, you will also be able to view your health information using other applications (apps) compatible with our system.

## 2023-06-22 NOTE — ED PROVIDER NOTE - NSFOLLOWUPINSTRUCTIONS_ED_ALL_ED_FT
Please take Benadryl OTC 50mg before bed, this will help with sleep and nasal congestion.  Viral Respiratory Infection, possible Bacterial conversion of ear and chest symptoms (ear and chest symptoms).     We are treating you with Azithromycin incase this is occurring.     Your Respiratory Viral Panel will be back in a couple of hours. You can call back for results or check the portal.     A viral respiratory infection is an illness that affects parts of the body used for breathing, like the lungs, nose, and throat. It is caused by a germ called a virus. Symptoms can include runny nose, coughing, sneezing, fatigue, body aches, sore throat, fever, or headache. Over the counter medicine can be used to manage the symptoms but the infection typically goes away on its own in 5 to 10 days.     SEEK IMMEDIATE MEDICAL CARE IF YOU HAVE ANY OF THE FOLLOWING SYMPTOMS: shortness of breath, chest pain, fever over 10 days, or lightheadedness/dizziness.     Conjunctivitis    Erythromycin ointment (that we gave your) 2-3 x/day for 3-5 days or until clear.     WHAT YOU NEED TO KNOW:    Conjunctivitis, or pink eye, is inflammation of your conjunctiva. The conjunctiva is a thin tissue that covers the front of your eye and the back of your eyelids. The conjunctiva helps protect your eye and keep it moist. Conjunctivitis may be caused by bacteria, allergies, or a virus. If your conjunctivitis is caused by bacteria, it may get better on its own in about 7 days. Viral conjunctivitis can last up to 3 weeks. Conjunctivitis         DISCHARGE INSTRUCTIONS:    Return to the emergency department if:     You have worsening eye pain.       The swelling in your eye gets worse, even after treatment.       Your vision suddenly becomes worse or you cannot see at all.    Contact your healthcare provider if:     You develop a fever and ear pain.      You have tiny bumps or spots of blood on your eye.      You have questions or concerns about your condition or care.    Manage your symptoms:     Apply a cool compress. Wet a washcloth with cold water and place it on your eye. This will help decrease itching and irritation.      Do not wear contact lenses. They can irritate your eye. Throw away the pair you are using and ask when you can wear them again. Use a new pair of lenses when your healthcare provider says it is okay.       Avoid irritants. Stay away from smoke filled areas. Shield your eyes from wind and sun.       Flush your eye. You may need to flush your eye with saline to help decrease your symptoms. Ask for more information on how to flush your eye.     Medicines: Treatment depends on what is causing your conjunctivitis. You maybe given any of the following:    Allergy medicine helps decrease itchy, red, swollen eyes caused by allergies. It may be given as a pill, eye drops, or nasal spray.      Antibiotics may be needed if your conjunctivitis is caused by bacteria. This medicine may be given as a pill, eye drops, or eye ointment.      Take your medicine as directed. Contact your healthcare provider if you think your medicine is not helping or if you have side effects. Tell him or her if you are allergic to any medicine. Keep a list of the medicines, vitamins, and herbs you take. Include the amounts, and when and why you take them. Bring the list or the pill bottles to follow-up visits. Carry your medicine list with you in case of an emergency.    Prevent the spread of conjunctivitis:     Wash your hands with soap and water often. Wash your hands before and after you touch your eyes. Also wash your hands before you prepare or eat food and after you use the bathroom or change a diaper.      Avoid allergens. Try to avoid the things that cause your allergies, such as pets, dust, or grass.       Avoid contact with others. Do not share towels or washcloths. Try to stay away from others as much as possible. Ask when you can return to work or school.       Throw away eye makeup. The bacteria that caused your conjunctivitis can stay in eye makeup. Throw away mascara and other eye makeup.

## 2023-06-22 NOTE — ED ADULT NURSE NOTE - NSFALLUNIVINTERV_ED_ALL_ED
Bed/Stretcher in lowest position, wheels locked, appropriate side rails in place/Call bell, personal items and telephone in reach/Instruct patient to call for assistance before getting out of bed/chair/stretcher/Non-slip footwear applied when patient is off stretcher/[place] to call system/Physically safe environment - no spills, clutter or unnecessary equipment/Purposeful proactive rounding/Room/bathroom lighting operational, light cord in reach

## 2023-06-22 NOTE — ED ADULT NURSE NOTE - OBJECTIVE STATEMENT
Pt came in c/o of R ear pain , L eye redness and drainage, congestion and intermittent dizziness x 5 days. Pt denies dizziness at this time. A&Ox3 speaking in full clear sentences

## 2023-06-22 NOTE — ED PROVIDER NOTE - CLINICAL SUMMARY MEDICAL DECISION MAKING FREE TEXT BOX
68-year-old male presenting with worsening URI symptoms over the past 5 days.  Will check RVP and chest x-ray.  Will give nasal decongestants and acetaminophen.  Possible discharge on azithromycin given age, comorbidities and progression of symptoms after 5 days.  It is possible that he is having a bacterial conversion of pulmonary symptoms or an early otitis media.  I recommended OTC Benadryl for sleep as this will help with both insomnia and nasal congestion

## 2023-06-22 NOTE — ED PROVIDER NOTE - OBJECTIVE STATEMENT
68-year-old man presenting with multiple URI symptoms.  He started off by having a sore throat this past Saturday [5 days ago].  His symptoms have now progressed to bilateral ear fullness, right greater than left, crusting tearing and redness of the left eye, general malaise with occasional nausea and a dry cough.  He is concerned because the symptoms seem to be getting worse and are progressing to involve different body parts.  He has also been having trouble sleeping secondary to the symptoms.  He is also experiencing significant anxiety over them, this is not uncommon for him as he has self-described underlying health related anxiety.    He has no shortness of breath and has not taken any over-the-counter remedies.  His vision is not affected.  His other medical history includes CAD and is status post stents, but he has an excellent exercise tolerance.  He also has GERD and takes Protonix.    He is accompanied by his  who was in the room for the exam.

## 2023-06-22 NOTE — ED ADULT TRIAGE NOTE - CHIEF COMPLAINT QUOTE
Pt complaining of L ear pain, R eye redness with + drainage, productive cough and intermittent dizziness x 6 days.

## 2023-06-22 NOTE — ED PROVIDER NOTE - NSICDXPASTMEDICALHX_GEN_ALL_CORE_FT
PAST MEDICAL HISTORY:  CAD (coronary artery disease)     GERD (gastroesophageal reflux disease)     HSV infection     Hypercholesterolemia     Sinusitis     Vasovagal syncope

## 2023-06-22 NOTE — ED PROVIDER NOTE - PHYSICAL EXAMINATION
VITAL SIGNS: I have reviewed nursing notes and confirm.   CONST: Well-developed; well-nourished; No apparent distress.  ENT: + nasal congestion; airway clear. TM's clear b/l  HEAD: Normocephalic; atraumatic.  EYES: Sclera clear R, injected L, + crustign on lids, no stye. Pupils round and symmetrical bilaterally.  CARD: S1, S2 normal; no murmurs, gallops, or rubs. Regular rate and rhythm.  RESP: No wheezes, rales or rhonchi. Breathing comfortably; speaking in full sentences.   ABD: Soft; non-distended; non-tender; no rebound or guarding.  : No CVA tenderness bilaterally.  MSK: No acute deformities noted to extremities. No tenderness to cervical/thoracic/lumbar spine to palpation.  NEURO: Alert, oriented. Speech is fluent and appropriate. Moving all extremities appropriately.   SKIN: Skin is normal temperature; no diaphoresis; no pallor.   PSYCH: Cooperative. Appropriate mood, language, and behavior.

## 2023-06-23 RX ORDER — AZITHROMYCIN 500 MG/1
1 TABLET, FILM COATED ORAL
Qty: 4 | Refills: 0
Start: 2023-06-23 | End: 2023-06-26

## 2023-06-28 ENCOUNTER — APPOINTMENT (OUTPATIENT)
Dept: OTOLARYNGOLOGY | Facility: CLINIC | Age: 68
End: 2023-06-28
Payer: MEDICARE

## 2023-06-28 VITALS
HEIGHT: 66 IN | DIASTOLIC BLOOD PRESSURE: 73 MMHG | BODY MASS INDEX: 33.75 KG/M2 | WEIGHT: 210 LBS | HEART RATE: 59 BPM | TEMPERATURE: 95.8 F | SYSTOLIC BLOOD PRESSURE: 124 MMHG

## 2023-06-28 DIAGNOSIS — Z86.79 PERSONAL HISTORY OF OTHER DISEASES OF THE CIRCULATORY SYSTEM: ICD-10-CM

## 2023-06-28 DIAGNOSIS — H90.3 SENSORINEURAL HEARING LOSS, BILATERAL: ICD-10-CM

## 2023-06-28 DIAGNOSIS — K11.20 SIALOADENITIS, UNSPECIFIED: ICD-10-CM

## 2023-06-28 DIAGNOSIS — Z95.5 PRESENCE OF CORONARY ANGIOPLASTY IMPLANT AND GRAFT: ICD-10-CM

## 2023-06-28 DIAGNOSIS — K21.9 GASTRO-ESOPHAGEAL REFLUX DISEASE W/OUT ESOPHAGITIS: ICD-10-CM

## 2023-06-28 DIAGNOSIS — J35.01 CHRONIC TONSILLITIS: ICD-10-CM

## 2023-06-28 DIAGNOSIS — M54.2 CERVICALGIA: ICD-10-CM

## 2023-06-28 PROBLEM — I25.10 ATHEROSCLEROTIC HEART DISEASE OF NATIVE CORONARY ARTERY WITHOUT ANGINA PECTORIS: Chronic | Status: ACTIVE | Noted: 2023-06-22

## 2023-06-28 PROCEDURE — 99204 OFFICE O/P NEW MOD 45 MIN: CPT | Mod: 25

## 2023-06-28 PROCEDURE — 92550 TYMPANOMETRY & REFLEX THRESH: CPT | Mod: 52

## 2023-06-28 PROCEDURE — 31575 DIAGNOSTIC LARYNGOSCOPY: CPT

## 2023-06-28 PROCEDURE — 92557 COMPREHENSIVE HEARING TEST: CPT

## 2023-06-28 RX ORDER — ASPIRIN 81 MG
81 TABLET, DELAYED RELEASE (ENTERIC COATED) ORAL
Refills: 0 | Status: ACTIVE | COMMUNITY

## 2023-06-28 RX ORDER — PANTOPRAZOLE SODIUM 20 MG/1
20 TABLET, DELAYED RELEASE ORAL
Refills: 0 | Status: ACTIVE | COMMUNITY

## 2023-06-28 RX ORDER — CLOPIDOGREL 75 MG/1
75 TABLET, FILM COATED ORAL
Refills: 0 | Status: ACTIVE | COMMUNITY

## 2023-06-28 NOTE — DATA REVIEWED
[de-identified] : \par AUDIOGRAM (2023)\par RIGHT: Mild to moderately severe SNHL \par LEFT: Hearing within normal limits 250-3K Hz, sloping to severe HF HL \par Asymmetries at all frequencies, worse on right side.  \par Significant decrease at all frequencies on right  and at 6K Hz on left, compared to 2017 audiogram.\par Type A Tympanograms bilaterally \par Word recognition: 100% bilaterally.\par \par AUDIOGRAM (2017)\par Bilateral hearing WNL through 4K Hz, sloping to a mild to moderate HL\par (Compared to last  1/14/15, air conduction levels decreased at 8K Hz on right and 6-8K Hz on left)\par Tympanograms A bilateral \par Word recognition 100% bilateral \par \par AUDIOGRAM (2015)\par Bilateral hearing WNL through 6K Hz, sloping to a mild HL at 8K Hz.\par Tympanograms A bilateral \par Word recognition 100% bilateral

## 2023-06-28 NOTE — PHYSICAL EXAM
[FreeTextEntry1] : No hoarseness.  [Nasal Endoscopy Performed] : nasal endoscopy was performed, see procedure section for findings [] : septum deviated bilaterally [de-identified] : Mild inferior turbinate hypertrophy  [Removed] : palatine tonsils previously removed [Laryngoscopy Performed] : laryngoscopy was performed, see procedure section for findings [de-identified] : Carotid pulses 2+ bilateral.  [Normal] : no rashes

## 2023-06-28 NOTE — CONSULT LETTER
[Dear  ___] : Dear  [unfilled], [( Thank you for referring [unfilled] for consultation for _____ )] : Thank you for referring [unfilled] for consultation for [unfilled] [Please see my note below.] : Please see my note below. [Consult Closing:] : Thank you very much for allowing me to participate in the care of this patient.  If you have any questions, please do not hesitate to contact me. [Sincerely,] : Sincerely, [FreeTextEntry2] : Dahlia Favreau-Herz, MD\par [address] [place] [address]\[place] [address]  [FreeTextEntry3] : \par Natalie Trejo MD \par Otolaryngology, Head and Neck Surgery \par \par   [___] : [unfilled]

## 2023-06-28 NOTE — HISTORY OF PRESENT ILLNESS
[de-identified] : Mr. WADSWORTH is a 68 year old man who was referred for sore throat by Dr. Favreau-Herz.\par  \par In September 2022 he began having severe left throat pain along with left neck pain and nausea. He saw a GI doctor who said he had gastroesophageal reflux disease. H. Pylori was negative. He was started on Protonix once a day and reflux precaution diet. Since then he has decreased throat and neck pain. Ice pack improves the neck pain.  HE has occasional dry cough.  No trouble swallowing.\par \par About 10 days ago he got a cold, viral infection in his eye, sinus inflammation, hoarseness, with clogging and HL in the right ear. starting 5 days later.  His symptoms improved except for maxillary facial pressure and left jaw pain. A week ago he started 5 day course of antibiotics and Sudafed, after which sinus pressure and cough improved.  Right HL and ear clogging are a little better too. He has static sound "like a hissing" on right. He has a lot of mucus in his throat.\par No ear drainage, runny nose, congestion, postnasal drip now. \par On Plavix after 70% LAD blockage was treated with stents in March 2023\par S/p left tonsillectomy for chronic tonsillitis in August 2018.\par

## 2023-06-28 NOTE — REVIEW OF SYSTEMS
[Patient Intake Form Reviewed] : Patient intake form was reviewed [As Noted in HPI] : as noted in HPI [Dry Eyes] : dry eyes [Eyes Itch] : itching of the eyes [Cough] : cough [Negative] : Heme/Lymph [FreeTextEntry7] : nausea

## 2023-06-28 NOTE — PROCEDURE
[FreeTextEntry6] : \par Indication:  acute sinusitis\par -Verbal consent was obtained from patient prior to exam. \par - Oxymetazoline 0.05% and lidocaine 2% spray applied to nose bilaterally.\par Nasal endoscopy was performed with flexible  scope.\par Findings: \par -- Inferior turbinates mildly edematous &   boggy  bilateral.  Inferior meatus clear bilateral.\par -- Septum was  S-shaped to right dorsally and to left against inferior turbinate \par -- No polyps either side nose\par -- Mucus clear bilateral\par -- Middle and superior turbinates normal bilateral\par -- Middle meatus clear bilateral.  SER clear bilateral.\par -- Nasopharynx without mass or exudate.  Adenoids were small\par -- Eustachian orifices were clear bilateral\par \par The patient tolerated the procedure well.\par   [de-identified] : \par Indication:  reflux\par -Verbal consent was obtained from patient prior to procedure.\par -Oxymetazoline 0.05% and lidocaine 2% spray applied to the nasal cavities.\par Flexible laryngoscopy was performed via right nostril and revealed the following:\par   -- Nasopharynx had no mass or exudate.  Adenoids small.\par   -- Base of tongue was symmetric and not enlarged.\par   -- Minimal cobblestoning on posterior pharyngeal wall. \par   -- Vallecula was clear\par   -- Epiglottis, both aryepiglottic folds and both false vocal folds were normal\par   -- Arytenoids both with moderate edema and mild erythema \par   -- True vocal folds were fully mobile and without lesions. \par   -- Post cricoid area was clear.  Thick clear mucus in pharynx.\par   -- Interarytenoid edema was  present.\par   -- No lesions seen in laryngopharynx\par \par The patient tolerated the procedure well.\par

## 2023-06-28 NOTE — ASSESSMENT
[FreeTextEntry1] : Mr. WADSWORTH is a 68 year old man who was evaluated for the following issues today:\par \par 1.) chronic throat pain and left neck pain since last year.\par  Agree that source is reflux.  Sx improving on Protonix and reflux precautions\par \par 2.) Right sudden SNHL and aural fullness for less than a week; due to recent viral URI.  He feels that sx are a little better over time.\par  Audiogram shows significant decrease in all frequencies in right ear vs left.  In 2017, his high frequency SNHL was symmetric.\par - prednisone 60 mg daily x 7 days (he was warned that oral steroids can make acid worse)\par - repeat audiogram in 1 week.  Depending on improvement, may recommend transtympanic steroid injection.\par \par Return in 1 week\par

## 2023-07-05 ENCOUNTER — APPOINTMENT (OUTPATIENT)
Dept: OTOLARYNGOLOGY | Facility: CLINIC | Age: 68
End: 2023-07-05
Payer: MEDICARE

## 2023-07-05 VITALS
WEIGHT: 211 LBS | HEART RATE: 46 BPM | SYSTOLIC BLOOD PRESSURE: 150 MMHG | TEMPERATURE: 96.1 F | HEIGHT: 66 IN | BODY MASS INDEX: 33.91 KG/M2 | DIASTOLIC BLOOD PRESSURE: 81 MMHG

## 2023-07-05 DIAGNOSIS — J06.9 ACUTE UPPER RESPIRATORY INFECTION, UNSPECIFIED: ICD-10-CM

## 2023-07-05 DIAGNOSIS — H93.8X1 OTHER SPECIFIED DISORDERS OF RIGHT EAR: ICD-10-CM

## 2023-07-05 PROCEDURE — 99214 OFFICE O/P EST MOD 30 MIN: CPT

## 2023-07-05 PROCEDURE — 92557 COMPREHENSIVE HEARING TEST: CPT

## 2023-07-05 PROCEDURE — 92550 TYMPANOMETRY & REFLEX THRESH: CPT | Mod: 52

## 2023-07-25 PROBLEM — H93.8X1 SENSATION OF FULLNESS IN RIGHT EAR: Status: RESOLVED | Noted: 2023-06-28 | Resolved: 2023-07-25

## 2023-07-25 PROBLEM — J06.9 RECENT URI: Status: RESOLVED | Noted: 2023-06-28 | Resolved: 2023-07-28

## 2023-07-25 RX ORDER — PREDNISONE 20 MG/1
20 TABLET ORAL
Qty: 21 | Refills: 0 | Status: COMPLETED | COMMUNITY
Start: 2023-06-28 | End: 2023-07-05

## 2023-07-25 NOTE — CONSULT LETTER
[Dear  ___] : Dear  [unfilled], [Courtesy Letter:] : I had the pleasure of seeing your patient, [unfilled], in my office today. [Please see my note below.] : Please see my note below. [Consult Closing:] : Thank you very much for allowing me to participate in the care of this patient.  If you have any questions, please do not hesitate to contact me. [Sincerely,] : Sincerely, [___] : [unfilled] [FreeTextEntry2] : Dahlia Favreau-Herz, MD\par [address] [place] [address]\[place] [address]  [FreeTextEntry3] : \par Natalie Trejo MD \par Otolaryngology, Head and Neck Surgery \par \par

## 2023-07-25 NOTE — PHYSICAL EXAM
[] : septum deviated bilaterally [Removed] : palatine tonsils previously removed [Normal] : no neck adenopathy [FreeTextEntry1] : No hoarseness.

## 2023-07-25 NOTE — ASSESSMENT
[FreeTextEntry1] : Mr. WADSWORTH is a 68 year old man who was evaluated for the following issues today:\par \par 1.) Right sudden SNHL due to preceding viral URI.  He reports that hearing, distorted sounds and tinnitus on right are much better or resolved after 6 days of high dose prednisone. \par  Audiogram today shows bilateral SNHL, with significant improvement on right side and persistent asymmetry at 3K Hz.  Word recognition is 100%.\par I do not recommend transtympanic steroid injection since he is satisfied with his hearing now.\par --> MRI IAC with and without contrast to r/o CPA lesion\par --> recheck audio in 3 months.\par \par 2.) chronic throat pain and left neck pain since last year - improving on Protonix and reflux precautions\par \par Telephone f/up after MRI\par

## 2023-07-25 NOTE — DATA REVIEWED
[de-identified] : \par AUDIOGRAM (2023)\par RIGHT: Hearing within normal limits sloping to moderately severe SNHL \par LEFT: Hearing within normal limits sloping to moderately severe SNHL\par Type A Tympanograms bilaterally \par Word recognition: 100% bilaterally. \par  \par \par AUDIOGRAM (2023)\par RIGHT: Mild to moderately severe SNHL \par LEFT: Hearing within normal limits 250-3K Hz, sloping to severe HF HL \par Asymmetries at all frequencies, worse on right side.  \par Significant decrease at all frequencies on right  and at 6K Hz on left, compared to 2017 audiogram.\par Type A Tympanograms bilaterally \par Word recognition: 100% bilaterally.\par \par AUDIOGRAM (2017)\par Bilateral hearing WNL through 4K Hz, sloping to a mild to moderate HL\par (Compared to last  1/14/15, air conduction levels decreased at 8K Hz on right and 6-8K Hz on left)\par Tympanograms A bilateral \par Word recognition 100% bilateral \par \par AUDIOGRAM (2015)\par Bilateral hearing WNL through 6K Hz, sloping to a mild HL at 8K Hz.\par Tympanograms A bilateral \par Word recognition 100% bilateral

## 2023-07-25 NOTE — HISTORY OF PRESENT ILLNESS
[de-identified] : Mr. WADSWORTH reports that GERD is much better on Protonix and diet changes.\par He had recent URI with cough, eye swelling/white discharge and felt eyelids caked up.  \par Right tinnitus  is much less after high dose oral steroids for sudden SNHL.  The tinny sound and echo are gone.  Hearing is improved.  Right ear pressure and clogging are gone now too.\par \par VISIT 6/28/2023\par Mr. WADSWORTH is a 68 year old man who was referred for sore throat by Dr. Favreau-Herz.\par  In September 2022 he began having severe left throat pain along with left neck pain and nausea. He saw a GI doctor who said he had gastroesophageal reflux disease. H. Pylori was negative. He was started on Protonix once a day and reflux precaution diet. Since then he has decreased throat and neck pain. Ice pack improves the neck pain.  HE has occasional dry cough.  No trouble swallowing.\par About 10 days ago he got a cold, viral infection in his eye, sinus inflammation, hoarseness, with clogging and HL in the right ear. starting 5 days later.  His symptoms improved except for maxillary facial pressure and left jaw pain. A week ago he started 5 day course of antibiotics and Sudafed, after which sinus pressure and cough improved.  Right HL and ear clogging are a little better too. He has static sound "like a hissing" on right. He has a lot of mucus in his throat.\par No ear drainage, runny nose, congestion, postnasal drip now. \par On Plavix after 70% LAD blockage was treated with stents in March 2023\par S/p left tonsillectomy for chronic tonsillitis in August 2018.\par

## 2023-07-26 ENCOUNTER — TRANSCRIPTION ENCOUNTER (OUTPATIENT)
Age: 68
End: 2023-07-26

## 2023-07-26 ENCOUNTER — NON-APPOINTMENT (OUTPATIENT)
Age: 68
End: 2023-07-26

## 2023-10-04 ENCOUNTER — APPOINTMENT (OUTPATIENT)
Dept: OTOLARYNGOLOGY | Facility: CLINIC | Age: 68
End: 2023-10-04
Payer: MEDICARE

## 2023-10-04 VITALS
TEMPERATURE: 97 F | WEIGHT: 211 LBS | DIASTOLIC BLOOD PRESSURE: 81 MMHG | BODY MASS INDEX: 33.91 KG/M2 | SYSTOLIC BLOOD PRESSURE: 145 MMHG | HEART RATE: 66 BPM | HEIGHT: 66 IN

## 2023-10-04 DIAGNOSIS — R07.0 PAIN IN THROAT: ICD-10-CM

## 2023-10-04 DIAGNOSIS — H91.20 SUDDEN IDIOPATHIC HEARING LOSS, UNSPECIFIED EAR: ICD-10-CM

## 2023-10-04 DIAGNOSIS — G89.29 PAIN IN THROAT: ICD-10-CM

## 2023-10-04 DIAGNOSIS — H93.11 TINNITUS, RIGHT EAR: ICD-10-CM

## 2023-10-04 DIAGNOSIS — H93.13 TINNITUS, BILATERAL: ICD-10-CM

## 2023-10-04 DIAGNOSIS — K21.00 GASTRO-ESOPHAGEAL REFLUX DISEASE WITH ESOPHAGITIS, WITHOUT BLEEDING: ICD-10-CM

## 2023-10-04 PROCEDURE — 99213 OFFICE O/P EST LOW 20 MIN: CPT | Mod: 1L

## 2023-10-04 PROCEDURE — 92557 COMPREHENSIVE HEARING TEST: CPT

## 2023-10-04 PROCEDURE — 92550 TYMPANOMETRY & REFLEX THRESH: CPT | Mod: 52

## 2024-01-04 ENCOUNTER — APPOINTMENT (OUTPATIENT)
Dept: OTOLARYNGOLOGY | Facility: CLINIC | Age: 69
End: 2024-01-04

## 2024-02-07 NOTE — ED ADULT NURSE NOTE - CAS EDN DISCHARGE INTERVENTIONS
Start flonase 2 sprays each nostril daily  Start allegra once daily  Continue albuterol 2-3 times a day   Follow up in 2 weeks     IV discontinued, cath removed intact

## 2024-03-28 PROBLEM — H93.11 TINNITUS, RIGHT: Status: ACTIVE | Noted: 2024-03-28

## 2024-03-28 PROBLEM — R07.0 CHRONIC THROAT PAIN: Status: ACTIVE | Noted: 2018-06-20

## 2024-03-28 PROBLEM — H93.13 BILATERAL TINNITUS: Status: RESOLVED | Noted: 2017-11-27 | Resolved: 2024-03-28

## 2024-03-28 PROBLEM — K21.00 CHRONIC REFLUX ESOPHAGITIS: Status: ACTIVE | Noted: 2023-06-28

## 2024-03-28 PROBLEM — H91.20 SUDDEN-ONSET SENSORINEURAL HEARING LOSS: Status: ACTIVE | Noted: 2023-06-28

## 2024-03-28 NOTE — PHYSICAL EXAM
[FreeTextEntry1] : No hoarseness.  [] : septum deviated bilaterally [Removed] : palatine tonsils previously removed [Normal] : no neck adenopathy

## 2024-03-28 NOTE — CONSULT LETTER
[FreeTextEntry3] : \par  Natalie Trejo MD \par  Otolaryngology, Head and Neck Surgery \par  \par    [FreeTextEntry2] : Dahlia Favreau-Herz, MD\par  [address] [place] [address]\[place] [address]

## 2024-03-28 NOTE — DATA REVIEWED
[de-identified] : AUDIOGRAM (10/04/2023) RIGHT: Hearing within normal limits sloping to moderately severe SNHL LEFT: Hearing within normal limits sloping to moderately severe SNHL Type A Tympanogram bilateral Word recognition: 100% on right; 90% on left.  AUDIOGRAM (2023) RIGHT: Hearing within normal limits sloping to moderately severe SNHL  LEFT: Hearing within normal limits sloping to moderately severe SNHL Type A Tympanograms bilaterally  Word recognition: 100% bilaterally.    AUDIOGRAM (2023) RIGHT: Mild to moderately severe SNHL  LEFT: Hearing within normal limits 250-3K Hz, sloping to severe HF HL  Asymmetries at all frequencies, worse on right side.   Significant decrease at all frequencies on right  and at 6K Hz on left, compared to 2017 audiogram. Type A Tympanograms bilaterally  Word recognition: 100% bilaterally.  AUDIOGRAM (2017) Bilateral hearing WNL through 4K Hz, sloping to a mild to moderate HL (Compared to last  1/14/15, air conduction levels decreased at 8K Hz on right and 6-8K Hz on left) Tympanograms A bilateral  Word recognition 100% bilateral   AUDIOGRAM (2015) Bilateral hearing WNL through 6K Hz, sloping to a mild HL at 8K Hz. Tympanograms A bilateral  Word recognition 100% bilateral

## 2024-03-28 NOTE — ASSESSMENT
[FreeTextEntry1] : Mr. WADSWORTH is a 68 year old man who was evaluated for the following issues today:  1.) Right tinnitus remains after sudden right SNHL associated with preceding viral URI in June 2023.  He was treated with 6 days of high dose prednisone.  He reports that hearing seems fine now; no more distorted sounds.  Tinnitus on right side is softer and does not interfere with his daily activities.   Audiogram today shows almost symmetric bilateral high frequency SNHL; word recognition is %. MRI IAC with and without contrast did not show an IAC or CPA lesion. He does not feel that he needs hearing aids. --> recheck audio in 1 year  2.) chronic throat pain has almost resolved since last year.  Phlegm clearance in the AMs relieves his pain. --> continue Protonix and reflux precautions  Return in 1 year

## 2024-03-28 NOTE — HISTORY OF PRESENT ILLNESS
[de-identified] : Mr. WADSWORTH still has tinnitus on right which is not bothersome.   He feels like hearing is fine. MRI IAC +/- (7/2019) showed no CPA or IAC mass or abnormal enhancement. No stomach irritation or heartburn.  Only has phlegm stuck in throat in AMs. Once he hocks out the phlegm, his sore throat disappears.  VISIT 7/05/2023 Mr. WADSWORTH reports that GERD is much better on Protonix and diet changes. He had recent URI with cough, eye swelling/white discharge and felt eyelids caked up.   Right tinnitus is much less after high dose oral steroids for sudden SNHL.  The tinny sound and echo are gone.  Hearing is improved.  Right ear pressure and clogging are gone now too.  VISIT 6/28/2023 Mr. WADSWORTH is a 68 year old man who was referred for sore throat by Dr. Favreau-Herz.  In September 2022 he began having severe left throat pain along with left neck pain and nausea. He saw a GI doctor who said he had gastroesophageal reflux disease. H. Pylori was negative. He was started on Protonix once a day and reflux precaution diet. Since then he has decreased throat and neck pain. Ice pack improves the neck pain.  HE has occasional dry cough.  No trouble swallowing. About 10 days ago he got a cold, viral infection in his eye, sinus inflammation, hoarseness, with clogging and HL in the right ear. starting 5 days later.  His symptoms improved except for maxillary facial pressure and left jaw pain. A week ago he started 5 day course of antibiotics and Sudafed, after which sinus pressure and cough improved.  Right HL and ear clogging are a little better too. He has static sound "like a hissing" on right. He has a lot of mucus in his throat. No ear drainage, runny nose, congestion, postnasal drip now.  On Plavix after 70% LAD blockage was treated with stents in March 2023 S/p left tonsillectomy for chronic tonsillitis in August 2018.

## 2024-05-02 ENCOUNTER — APPOINTMENT (OUTPATIENT)
Dept: ORTHOPEDIC SURGERY | Facility: CLINIC | Age: 69
End: 2024-05-02
Payer: MEDICARE

## 2024-05-02 DIAGNOSIS — G89.29 PAIN IN THORACIC SPINE: ICD-10-CM

## 2024-05-02 DIAGNOSIS — M65.9 SYNOVITIS AND TENOSYNOVITIS, UNSPECIFIED: ICD-10-CM

## 2024-05-02 DIAGNOSIS — M54.6 PAIN IN THORACIC SPINE: ICD-10-CM

## 2024-05-02 PROCEDURE — 99203 OFFICE O/P NEW LOW 30 MIN: CPT

## 2024-05-02 PROCEDURE — 72070 X-RAY EXAM THORAC SPINE 2VWS: CPT

## 2024-10-02 ENCOUNTER — APPOINTMENT (OUTPATIENT)
Dept: OTOLARYNGOLOGY | Facility: CLINIC | Age: 69
End: 2024-10-02

## 2024-12-18 NOTE — ED ADULT NURSE NOTE - NS ED NURSE DC INFO COMPLEXITY
[facility]- Dr. Chambers
Moderate: Comprehensive teaching/Verbalized Understanding

## 2025-02-25 ENCOUNTER — EMERGENCY (EMERGENCY)
Facility: HOSPITAL | Age: 70
LOS: 1 days | Discharge: HOME | End: 2025-02-25
Attending: STUDENT IN AN ORGANIZED HEALTH CARE EDUCATION/TRAINING PROGRAM | Admitting: STUDENT IN AN ORGANIZED HEALTH CARE EDUCATION/TRAINING PROGRAM
Payer: MEDICARE

## 2025-02-25 VITALS
OXYGEN SATURATION: 97 % | DIASTOLIC BLOOD PRESSURE: 66 MMHG | HEART RATE: 52 BPM | RESPIRATION RATE: 18 BRPM | WEIGHT: 218.04 LBS | SYSTOLIC BLOOD PRESSURE: 141 MMHG | TEMPERATURE: 98 F

## 2025-02-25 VITALS — HEART RATE: 50 BPM

## 2025-02-25 DIAGNOSIS — Z90.89 ACQUIRED ABSENCE OF OTHER ORGANS: Chronic | ICD-10-CM

## 2025-02-25 DIAGNOSIS — Z95.5 PRESENCE OF CORONARY ANGIOPLASTY IMPLANT AND GRAFT: Chronic | ICD-10-CM

## 2025-02-25 PROCEDURE — 99284 EMERGENCY DEPT VISIT MOD MDM: CPT

## 2025-02-25 RX ORDER — ONDANSETRON 4 MG/1
4 TABLET, ORALLY DISINTEGRATING ORAL ONCE
Refills: 0 | Status: COMPLETED | OUTPATIENT
Start: 2025-02-25 | End: 2025-02-25

## 2025-02-25 RX ORDER — ONDANSETRON 4 MG/1
1 TABLET, ORALLY DISINTEGRATING ORAL
Qty: 9 | Refills: 0
Start: 2025-02-25 | End: 2025-02-27

## 2025-02-25 RX ADMIN — ONDANSETRON 4 MILLIGRAM(S): 4 TABLET, ORALLY DISINTEGRATING ORAL at 16:20

## 2025-02-25 NOTE — ED ADULT NURSE NOTE - OBJECTIVE STATEMENT
Received patient alert and oriented x 4 denies chest pain or SOB, no cardiac or respiratory distress noted. Patient stated that reason for ER visit is due to having a syncopal episode at home. Patient denies any dizziness at the moment. Patient is currently stable.

## 2025-02-25 NOTE — ED ADULT TRIAGE NOTE - CHIEF COMPLAINT QUOTE
pt BIBA for syncopal episode while getting blood drawn, pt vomiting upon ER arrival. 18g IV in the left AC placed by EMS, NS given as well. Hx Stent placement. Pt states being on aspirin and

## 2025-02-25 NOTE — ED PROVIDER NOTE - CLINICAL SUMMARY MEDICAL DECISION MAKING FREE TEXT BOX
Mike White MD (Attending MD): Is a 69-year-old male with history of CAD presenting to emergency department with 6 vasovagal episode after getting blood today.  Patient states that he did not eat or drink anything since morning, worked out today as well and then had episode while getting blood drawn.  Has happened many times in the past.  Denies any preceding symptoms such as chest pain, shortness of breath, nausea, vomiting, fever, chills.  Was being evaluated for chronic lower abdominal pain with lab work.  Had outside imaging which was nonactionable.  At this time patient is symptom-free.  No history of seizures.  No urinary continence.  Received IV fluids and route.  Patient feels well at this time.    Review of systems otherwise negative.      General: Alert and Orientated x 3. No apparent distress.  Head: Normocephalic and atraumatic.  Eyes: PERRLA with EOMI.  Neck: Supple. Trachea midline.   Cardiac: Normal S1 and S2 w/ RRR. No murmurs appreciated. No JVD appreciated.  Pulmonary: CTA bilaterally. No increased WOB. No wheezes or crackles.  Abdominal: Soft, non-tender. (+) bowel sounds appreciated in all 4 quadrants. No hepatosplenomegaly.   Neurologic: No focal sensory or motor deficits.  Musculoskeletal: Strength appropriate in all 4 extremities for age with no limited ROM.  Skin: Color appropriate for race. Intact, warm, and well-perfused.  Psychiatric: Appropriate mood and affect. No apparent risk to self or others.     MDM: Patient hemodynamically stable.  EKG without acute signs of ischemia.  I had in-depth discussion with patient, he would like to proceed without any further lab work at this time as he has known vasovagal episodes.  Patient ambulatory in ED, asymptomatic.  Patient just requesting Zofran times he has chronic lower abdominal pain.  Will send prescription to pharmacy and reassess.

## 2025-02-25 NOTE — ED PROVIDER NOTE - PATIENT PORTAL LINK FT
You can access the FollowMyHealth Patient Portal offered by [facility] by registering at the following website: [email or web address]. By joining [facility]’s FollowMyHealth portal, you will also be able to view your health information using other applications (apps) compatible with our system.

## 2025-02-25 NOTE — ED PROVIDER NOTE - NSFOLLOWUPINSTRUCTIONS_ED_ALL_ED_FT
You were seen in the Emergency Department for  vasovagal syncope. Lab and imaging results, if performed, were discussed with you along with your discharge diagnosis.     Follow up with your doctor in 1 week - bring copies of your results if you were given. If you do not have a primary doctor, please call [phone] to find one convenient for you.    Continue all prescribed medications.    Return to ED for any new or worsening symptoms including but not limited to: development of chest pain, shortness of breath, fever, vomiting, focal numbness, weakness or tingling, any severe CP, headache, abdominal pain, back pain.      Rest and keep yourself hydrated with fluids.

## 2025-02-27 DIAGNOSIS — I25.10 ATHEROSCLEROTIC HEART DISEASE OF NATIVE CORONARY ARTERY WITHOUT ANGINA PECTORIS: ICD-10-CM

## 2025-02-27 DIAGNOSIS — R55 SYNCOPE AND COLLAPSE: ICD-10-CM

## 2025-02-27 DIAGNOSIS — G89.29 OTHER CHRONIC PAIN: ICD-10-CM

## 2025-02-27 DIAGNOSIS — R10.30 LOWER ABDOMINAL PAIN, UNSPECIFIED: ICD-10-CM
